# Patient Record
Sex: MALE | Race: WHITE | NOT HISPANIC OR LATINO | Employment: OTHER | URBAN - METROPOLITAN AREA
[De-identification: names, ages, dates, MRNs, and addresses within clinical notes are randomized per-mention and may not be internally consistent; named-entity substitution may affect disease eponyms.]

---

## 2021-04-06 ENCOUNTER — TRANSCRIBE ORDERS (OUTPATIENT)
Dept: ADMINISTRATIVE | Facility: HOSPITAL | Age: 71
End: 2021-04-06

## 2021-04-06 DIAGNOSIS — Z87.891 ENCOUNTER FOR SCREENING FOR ABDOMINAL AORTIC ANEURYSM (AAA) IN PATIENT 50 YEARS OF AGE OR OLDER WITH HISTORY OF SMOKING: Primary | ICD-10-CM

## 2021-04-06 DIAGNOSIS — Z13.6 ENCOUNTER FOR SCREENING FOR ABDOMINAL AORTIC ANEURYSM (AAA) IN PATIENT 50 YEARS OF AGE OR OLDER WITH HISTORY OF SMOKING: Primary | ICD-10-CM

## 2021-05-04 ENCOUNTER — HOSPITAL ENCOUNTER (EMERGENCY)
Facility: HOSPITAL | Age: 71
Discharge: HOME | End: 2021-05-04
Attending: EMERGENCY MEDICINE | Admitting: EMERGENCY MEDICINE

## 2021-05-04 ENCOUNTER — APPOINTMENT (OUTPATIENT)
Dept: GENERAL RADIOLOGY | Facility: HOSPITAL | Age: 71
End: 2021-05-04

## 2021-05-04 ENCOUNTER — APPOINTMENT (OUTPATIENT)
Dept: CT IMAGING | Facility: HOSPITAL | Age: 71
End: 2021-05-04

## 2021-05-04 VITALS
DIASTOLIC BLOOD PRESSURE: 86 MMHG | TEMPERATURE: 98.5 F | HEART RATE: 60 BPM | SYSTOLIC BLOOD PRESSURE: 147 MMHG | OXYGEN SATURATION: 94 % | WEIGHT: 239 LBS | BODY MASS INDEX: 32.37 KG/M2 | HEIGHT: 72 IN | RESPIRATION RATE: 20 BRPM

## 2021-05-04 DIAGNOSIS — R42 DIZZINESS: Primary | ICD-10-CM

## 2021-05-04 LAB
ALBUMIN SERPL-MCNC: 4.2 G/DL (ref 3.5–5.2)
ALBUMIN/GLOB SERPL: 1.8 G/DL
ALP SERPL-CCNC: 69 U/L (ref 39–117)
ALT SERPL W P-5'-P-CCNC: 16 U/L (ref 1–41)
ANION GAP SERPL CALCULATED.3IONS-SCNC: 9.6 MMOL/L (ref 5–15)
AST SERPL-CCNC: 16 U/L (ref 1–40)
BACTERIA UR QL AUTO: ABNORMAL /HPF
BASOPHILS # BLD AUTO: 0.08 10*3/MM3 (ref 0–0.2)
BASOPHILS NFR BLD AUTO: 1 % (ref 0–1.5)
BILIRUB SERPL-MCNC: 0.9 MG/DL (ref 0–1.2)
BILIRUB UR QL STRIP: NEGATIVE
BUN SERPL-MCNC: 18 MG/DL (ref 8–23)
BUN/CREAT SERPL: 16.5 (ref 7–25)
CALCIUM SPEC-SCNC: 9.1 MG/DL (ref 8.6–10.5)
CHLORIDE SERPL-SCNC: 107 MMOL/L (ref 98–107)
CLARITY UR: CLEAR
CO2 SERPL-SCNC: 26.4 MMOL/L (ref 22–29)
COLOR UR: YELLOW
CREAT SERPL-MCNC: 1.09 MG/DL (ref 0.76–1.27)
DEPRECATED RDW RBC AUTO: 42.1 FL (ref 37–54)
EOSINOPHIL # BLD AUTO: 0.37 10*3/MM3 (ref 0–0.4)
EOSINOPHIL NFR BLD AUTO: 4.8 % (ref 0.3–6.2)
ERYTHROCYTE [DISTWIDTH] IN BLOOD BY AUTOMATED COUNT: 12.7 % (ref 12.3–15.4)
GFR SERPL CREATININE-BSD FRML MDRD: 67 ML/MIN/1.73
GFR SERPL CREATININE-BSD FRML MDRD: 81 ML/MIN/1.73
GLOBULIN UR ELPH-MCNC: 2.4 GM/DL
GLUCOSE SERPL-MCNC: 141 MG/DL (ref 65–99)
GLUCOSE UR STRIP-MCNC: NEGATIVE MG/DL
HCT VFR BLD AUTO: 45 % (ref 37.5–51)
HGB BLD-MCNC: 15 G/DL (ref 13–17.7)
HGB UR QL STRIP.AUTO: ABNORMAL
HYALINE CASTS UR QL AUTO: ABNORMAL /LPF
IMM GRANULOCYTES # BLD AUTO: 0.02 10*3/MM3 (ref 0–0.05)
IMM GRANULOCYTES NFR BLD AUTO: 0.3 % (ref 0–0.5)
KETONES UR QL STRIP: NEGATIVE
LEUKOCYTE ESTERASE UR QL STRIP.AUTO: NEGATIVE
LYMPHOCYTES # BLD AUTO: 2.02 10*3/MM3 (ref 0.7–3.1)
LYMPHOCYTES NFR BLD AUTO: 26.1 % (ref 19.6–45.3)
MCH RBC QN AUTO: 30.4 PG (ref 26.6–33)
MCHC RBC AUTO-ENTMCNC: 33.3 G/DL (ref 31.5–35.7)
MCV RBC AUTO: 91.3 FL (ref 79–97)
MONOCYTES # BLD AUTO: 0.8 10*3/MM3 (ref 0.1–0.9)
MONOCYTES NFR BLD AUTO: 10.3 % (ref 5–12)
NEUTROPHILS NFR BLD AUTO: 4.44 10*3/MM3 (ref 1.7–7)
NEUTROPHILS NFR BLD AUTO: 57.5 % (ref 42.7–76)
NITRITE UR QL STRIP: NEGATIVE
NRBC BLD AUTO-RTO: 0 /100 WBC (ref 0–0.2)
PH UR STRIP.AUTO: 7 [PH] (ref 4.5–8)
PLATELET # BLD AUTO: 271 10*3/MM3 (ref 140–450)
PMV BLD AUTO: 10.2 FL (ref 6–12)
POTASSIUM SERPL-SCNC: 3.9 MMOL/L (ref 3.5–5.2)
PROT SERPL-MCNC: 6.6 G/DL (ref 6–8.5)
PROT UR QL STRIP: NEGATIVE
RBC # BLD AUTO: 4.93 10*6/MM3 (ref 4.14–5.8)
RBC # UR: ABNORMAL /HPF
REF LAB TEST METHOD: ABNORMAL
SODIUM SERPL-SCNC: 143 MMOL/L (ref 136–145)
SP GR UR STRIP: 1.02 (ref 1–1.03)
SQUAMOUS #/AREA URNS HPF: ABNORMAL /HPF
TROPONIN T SERPL-MCNC: <0.01 NG/ML (ref 0–0.03)
UROBILINOGEN UR QL STRIP: ABNORMAL
WBC # BLD AUTO: 7.73 10*3/MM3 (ref 3.4–10.8)
WBC UR QL AUTO: ABNORMAL /HPF

## 2021-05-04 PROCEDURE — 99284 EMERGENCY DEPT VISIT MOD MDM: CPT

## 2021-05-04 PROCEDURE — 99284 EMERGENCY DEPT VISIT MOD MDM: CPT | Performed by: EMERGENCY MEDICINE

## 2021-05-04 PROCEDURE — 70450 CT HEAD/BRAIN W/O DYE: CPT

## 2021-05-04 PROCEDURE — 80053 COMPREHEN METABOLIC PANEL: CPT | Performed by: EMERGENCY MEDICINE

## 2021-05-04 PROCEDURE — 93010 ELECTROCARDIOGRAM REPORT: CPT | Performed by: INTERNAL MEDICINE

## 2021-05-04 PROCEDURE — 93005 ELECTROCARDIOGRAM TRACING: CPT | Performed by: EMERGENCY MEDICINE

## 2021-05-04 PROCEDURE — 71046 X-RAY EXAM CHEST 2 VIEWS: CPT

## 2021-05-04 PROCEDURE — 84484 ASSAY OF TROPONIN QUANT: CPT | Performed by: EMERGENCY MEDICINE

## 2021-05-04 PROCEDURE — 85025 COMPLETE CBC W/AUTO DIFF WBC: CPT | Performed by: EMERGENCY MEDICINE

## 2021-05-04 PROCEDURE — 81001 URINALYSIS AUTO W/SCOPE: CPT | Performed by: EMERGENCY MEDICINE

## 2021-05-04 RX ORDER — METFORMIN HYDROCHLORIDE 750 MG/1
750 TABLET, EXTENDED RELEASE ORAL DAILY
COMMUNITY
Start: 2021-04-29 | End: 2022-04-29

## 2021-05-04 RX ORDER — LOSARTAN POTASSIUM 100 MG/1
100 TABLET ORAL DAILY
COMMUNITY
Start: 2021-04-29

## 2021-05-04 RX ORDER — POLYETHYLENE GLYCOL 3350 17 G/17G
17 POWDER, FOR SOLUTION ORAL AS NEEDED
COMMUNITY
Start: 2021-04-02 | End: 2022-04-02

## 2021-05-04 RX ORDER — ATORVASTATIN CALCIUM 20 MG/1
20 TABLET, FILM COATED ORAL DAILY
COMMUNITY
Start: 2021-04-29

## 2021-05-04 RX ORDER — AMLODIPINE BESYLATE 5 MG/1
5 TABLET ORAL DAILY
COMMUNITY
Start: 2021-04-29

## 2021-05-04 RX ORDER — ERGOCALCIFEROL 1.25 MG/1
50000 CAPSULE ORAL WEEKLY
COMMUNITY
Start: 2021-04-02 | End: 2021-07-01

## 2021-05-04 RX ORDER — BLOOD-GLUCOSE METER
1 KIT MISCELLANEOUS DAILY
COMMUNITY
Start: 2021-04-02 | End: 2022-04-02

## 2021-05-04 RX ORDER — FLUTICASONE PROPIONATE 50 MCG
1 SPRAY, SUSPENSION (ML) NASAL DAILY
COMMUNITY
Start: 2021-04-28 | End: 2022-06-07 | Stop reason: SDUPTHER

## 2021-05-04 RX ORDER — PANTOPRAZOLE SODIUM 40 MG/1
40 TABLET, DELAYED RELEASE ORAL DAILY
COMMUNITY
Start: 2021-04-28

## 2021-05-04 RX ORDER — MECLIZINE HYDROCHLORIDE 25 MG/1
25 TABLET ORAL 3 TIMES DAILY PRN
Qty: 30 TABLET | Refills: 0 | Status: SHIPPED | OUTPATIENT
Start: 2021-05-04

## 2021-05-04 RX ORDER — TAMSULOSIN HYDROCHLORIDE 0.4 MG/1
0.4 CAPSULE ORAL DAILY
COMMUNITY
Start: 2021-04-29

## 2021-05-04 RX ORDER — SODIUM CHLORIDE 0.9 % (FLUSH) 0.9 %
10 SYRINGE (ML) INJECTION AS NEEDED
Status: DISCONTINUED | OUTPATIENT
Start: 2021-05-04 | End: 2021-05-04 | Stop reason: HOSPADM

## 2021-05-06 LAB — QT INTERVAL: 399 MS

## 2021-05-25 ENCOUNTER — TRANSCRIBE ORDERS (OUTPATIENT)
Dept: ADMINISTRATIVE | Facility: HOSPITAL | Age: 71
End: 2021-05-25

## 2021-05-25 ENCOUNTER — HOSPITAL ENCOUNTER (OUTPATIENT)
Dept: CARDIOLOGY | Facility: HOSPITAL | Age: 71
Discharge: HOME | End: 2021-05-25
Admitting: NURSE PRACTITIONER

## 2021-05-25 VITALS
WEIGHT: 232 LBS | BODY MASS INDEX: 31.42 KG/M2 | HEIGHT: 72 IN | DIASTOLIC BLOOD PRESSURE: 80 MMHG | SYSTOLIC BLOOD PRESSURE: 154 MMHG | HEART RATE: 62 BPM

## 2021-05-25 DIAGNOSIS — Z87.891 ENCOUNTER FOR SCREENING FOR ABDOMINAL AORTIC ANEURYSM (AAA) IN PATIENT 50 YEARS OF AGE OR OLDER WITH HISTORY OF SMOKING: ICD-10-CM

## 2021-05-25 DIAGNOSIS — H90.0 CONDUCTIVE HEARING LOSS, BILATERAL: Primary | ICD-10-CM

## 2021-05-25 DIAGNOSIS — Z13.6 ENCOUNTER FOR SCREENING FOR ABDOMINAL AORTIC ANEURYSM (AAA) IN PATIENT 50 YEARS OF AGE OR OLDER WITH HISTORY OF SMOKING: ICD-10-CM

## 2021-05-25 LAB
BH CV VAS BP LEFT ARM: NORMAL MMHG
BH CV VAS BP RIGHT ARM: NORMAL MMHG
BH CV XLRA MEAS LEFT ICA/CCA RATIO: 1.02
BH CV XLRA MEAS LEFT MID CCA PSV: NORMAL CM/SEC
BH CV XLRA MEAS LEFT MID ICA PSV: NORMAL CM/SEC
BH CV XLRA MEAS LEFT PROX ECA PSV: NORMAL CM/SEC
BH CV XLRA MEAS RIGHT ICA/CCA RATIO: 1.15
BH CV XLRA MEAS RIGHT MID CCA PSV: NORMAL CM/SEC
BH CV XLRA MEAS RIGHT MID ICA PSV: NORMAL CM/SEC
BH CV XLRA MEAS RIGHT PROX ECA PSV: NORMAL CM/SEC

## 2021-05-25 PROCEDURE — 93799 UNLISTED CV SVC/PROCEDURE: CPT

## 2021-06-16 ENCOUNTER — HOSPITAL ENCOUNTER (OUTPATIENT)
Dept: MRI IMAGING | Facility: HOSPITAL | Age: 71
Discharge: HOME | End: 2021-06-16

## 2021-06-16 DIAGNOSIS — H90.0 CONDUCTIVE HEARING LOSS, BILATERAL: ICD-10-CM

## 2021-06-22 ENCOUNTER — HOSPITAL ENCOUNTER (OUTPATIENT)
Dept: MRI IMAGING | Facility: HOSPITAL | Age: 71
Discharge: HOME | End: 2021-06-22

## 2021-06-23 ENCOUNTER — HOSPITAL ENCOUNTER (OUTPATIENT)
Dept: MRI IMAGING | Facility: HOSPITAL | Age: 71
Discharge: HOME | End: 2021-06-23
Admitting: OTOLARYNGOLOGY

## 2021-06-23 PROCEDURE — 0 GADOBENATE DIMEGLUMINE 529 MG/ML SOLUTION: Performed by: OTOLARYNGOLOGY

## 2021-06-23 PROCEDURE — 70553 MRI BRAIN STEM W/O & W/DYE: CPT

## 2021-06-23 PROCEDURE — A9577 INJ MULTIHANCE: HCPCS | Performed by: OTOLARYNGOLOGY

## 2021-06-23 RX ADMIN — GADOBENATE DIMEGLUMINE 20 ML: 529 INJECTION, SOLUTION INTRAVENOUS at 10:32

## 2021-07-07 ENCOUNTER — TRANSCRIBE ORDERS (OUTPATIENT)
Dept: ADMINISTRATIVE | Facility: HOSPITAL | Age: 71
End: 2021-07-07

## 2021-07-07 DIAGNOSIS — Z13.6 SCREENING FOR AAA (AORTIC ABDOMINAL ANEURYSM): Primary | ICD-10-CM

## 2021-07-16 ENCOUNTER — HOSPITAL ENCOUNTER (OUTPATIENT)
Dept: ULTRASOUND IMAGING | Facility: HOSPITAL | Age: 71
Discharge: HOME | End: 2021-07-16
Admitting: NURSE PRACTITIONER

## 2021-07-16 DIAGNOSIS — Z13.6 SCREENING FOR AAA (AORTIC ABDOMINAL ANEURYSM): ICD-10-CM

## 2021-07-16 PROCEDURE — 76706 US ABDL AORTA SCREEN AAA: CPT

## 2021-12-21 ENCOUNTER — OFFICE VISIT (OUTPATIENT)
Dept: ORTHOPEDIC SURGERY | Facility: CLINIC | Age: 71
End: 2021-12-21

## 2021-12-21 VITALS
DIASTOLIC BLOOD PRESSURE: 80 MMHG | BODY MASS INDEX: 30.48 KG/M2 | WEIGHT: 225 LBS | SYSTOLIC BLOOD PRESSURE: 158 MMHG | HEART RATE: 74 BPM | HEIGHT: 72 IN

## 2021-12-21 DIAGNOSIS — G89.29 CHRONIC PAIN OF RIGHT KNEE: Primary | ICD-10-CM

## 2021-12-21 DIAGNOSIS — M25.561 CHRONIC PAIN OF RIGHT KNEE: Primary | ICD-10-CM

## 2021-12-21 DIAGNOSIS — M17.11 PRIMARY OSTEOARTHRITIS OF RIGHT KNEE: ICD-10-CM

## 2021-12-21 PROCEDURE — 73562 X-RAY EXAM OF KNEE 3: CPT | Performed by: ORTHOPAEDIC SURGERY

## 2021-12-21 PROCEDURE — 99203 OFFICE O/P NEW LOW 30 MIN: CPT | Performed by: ORTHOPAEDIC SURGERY

## 2021-12-21 PROCEDURE — 20610 DRAIN/INJ JOINT/BURSA W/O US: CPT | Performed by: ORTHOPAEDIC SURGERY

## 2021-12-21 RX ORDER — ASPIRIN 81 MG/1
81 TABLET ORAL DAILY
COMMUNITY
Start: 2021-07-06 | End: 2022-07-06

## 2021-12-21 RX ORDER — BETAMETHASONE SODIUM PHOSPHATE AND BETAMETHASONE ACETATE 3; 3 MG/ML; MG/ML
6 INJECTION, SUSPENSION INTRA-ARTICULAR; INTRALESIONAL; INTRAMUSCULAR; SOFT TISSUE
Status: COMPLETED | OUTPATIENT
Start: 2021-12-21 | End: 2021-12-21

## 2021-12-21 RX ORDER — LIDOCAINE HYDROCHLORIDE 10 MG/ML
4 INJECTION, SOLUTION EPIDURAL; INFILTRATION; INTRACAUDAL; PERINEURAL
Status: COMPLETED | OUTPATIENT
Start: 2021-12-21 | End: 2021-12-21

## 2021-12-21 RX ADMIN — BETAMETHASONE SODIUM PHOSPHATE AND BETAMETHASONE ACETATE 6 MG: 3; 3 INJECTION, SUSPENSION INTRA-ARTICULAR; INTRALESIONAL; INTRAMUSCULAR; SOFT TISSUE at 13:45

## 2021-12-21 RX ADMIN — LIDOCAINE HYDROCHLORIDE 4 ML: 10 INJECTION, SOLUTION EPIDURAL; INFILTRATION; INTRACAUDAL; PERINEURAL at 13:45

## 2021-12-21 NOTE — PROGRESS NOTES
Large Joint Arthrocentesis: R knee  Date/Time: 12/21/2021 1:45 PM  Consent given by: patient  Site marked: site marked  Timeout: Immediately prior to procedure a time out was called to verify the correct patient, procedure, equipment, support staff and site/side marked as required   Supporting Documentation  Indications: pain   Procedure Details  Location: knee - R knee  Preparation: Patient was prepped and draped in the usual sterile fashion  Needle size: 22 G  Approach: superior  Medications administered: 6 mg betamethasone acetate-betamethasone sodium phosphate 6 (3-3) MG/ML; 4 mL lidocaine PF 1% 1 %  Patient tolerance: patient tolerated the procedure well with no immediate complications      Subjective: Right knee pain     Patient ID: Cory Hardwick is a 71 y.o. male.    Chief Complaint:    History of Present Illness 71-year male presents evaluation of his right knee which is symptomatic for several years for constant progressive discomfort.  States he really does not have any pain but the knee just gives out without warning going on again for several years.  No history of trauma.  He did undergo a left total knee arthroplasty living in [place] 3 years ago and the only complaint he has with that is lack of flexion of the knee.  Again he denies any knee pain.       Social History     Occupational History   • Not on file   Tobacco Use   • Smoking status: Never Smoker   • Smokeless tobacco: Never Used   Vaping Use   • Vaping Use: Never used   Substance and Sexual Activity   • Alcohol use: Not Currently   • Drug use: Never   • Sexual activity: Defer      Review of Systems   Constitutional: Negative for chills, diaphoresis, fever and unexpected weight change.   HENT: Negative for hearing loss, nosebleeds, sore throat and tinnitus.    Eyes: Negative for pain and visual disturbance.   Respiratory: Negative for cough, shortness of breath and wheezing.    Cardiovascular: Negative for chest pain and palpitations.    Gastrointestinal: Negative for abdominal pain, diarrhea, nausea and vomiting.   Endocrine: Negative for cold intolerance, heat intolerance and polydipsia.   Genitourinary: Negative for difficulty urinating, dysuria and hematuria.   Musculoskeletal: Positive for arthralgias and myalgias. Negative for joint swelling.   Skin: Negative for rash and wound.   Allergic/Immunologic: Negative for environmental allergies.   Neurological: Negative for dizziness, syncope and numbness.   Hematological: Does not bruise/bleed easily.   Psychiatric/Behavioral: Negative for dysphoric mood and sleep disturbance. The patient is not nervous/anxious.          Past Medical History:   Diagnosis Date   • Balance disorder    • Diabetes mellitus (HCC)    • Dizziness    • Fall    • GERD (gastroesophageal reflux disease)    • Hypertension    • Low vitamin D level    • Memory loss    • Peripheral neuropathy      Past Surgical History:   Procedure Laterality Date   • JOINT REPLACEMENT      right hip    • JOINT REPLACEMENT      knee      History reviewed. No pertinent family history.      Objective:  Vitals:    12/21/21 1310   BP: 158/80   Pulse: 74         12/21/21  1310   Weight: 102 kg (225 lb)     Body mass index is 30.52 kg/m².        Ortho Exam  AP lateral sunrise view of the right knee does show early arthritic changes with decrease in the medial joint space with osteophytes medially in the patellofemoral joint.  No prior x-rays available for comparison.  He is alert and oriented x3.  Head is normocephalic and sclerae clear.  The right knee shows no swelling effusion erythema but there is mild crepitus with range of motion to 0 125 degrees.  Quad and hamstring function is 5/5.  No instability at 0 or 90 degrees nor any varus or valgus instability at 10 to 30 degrees.  Mild medial joint line tenderness with negative Lucille's.  His calves are nontender.  He denies any motor or sensory deficit he has good capillary refill the skin is cool  to touch.  He is diabetic and is not taking any anti-inflammatories.    Assessment:        1. Chronic pain of right knee    2. Primary osteoarthritis of right knee           Plan: Reviewed at length with the patient his x-rays history and physical exam.  He does not have any pain just a sensation of the knee giving way which is due to the early arthritic changes.  After reviewing treatment options again proceed with a cortisone injection of lidocaine and Celestone at a ratio 4:1 which was given through the superolateral portal tolerating well.  Postinjection instructions particular about monitoring blood glucose level for the next 2 to 3 days given.  Return to see me as needed.  Answered all questions            Work Status:    DASHAWN query complete.    Orders:  Orders Placed This Encounter   Procedures   • Large Joint Arthrocentesis: R knee   • XR Knee 3+ View With [place] Right       Medications:  No orders of the defined types were placed in this encounter.      Followup:  Return if symptoms worsen or fail to improve.          Dictated utilizing Dragon dictation

## 2022-06-07 ENCOUNTER — OFFICE VISIT (OUTPATIENT)
Dept: ORTHOPEDIC SURGERY | Facility: CLINIC | Age: 72
End: 2022-06-07

## 2022-06-07 VITALS — HEIGHT: 72 IN | WEIGHT: 225 LBS | BODY MASS INDEX: 30.48 KG/M2

## 2022-06-07 DIAGNOSIS — M25.562 CHRONIC PAIN OF LEFT KNEE: Primary | ICD-10-CM

## 2022-06-07 DIAGNOSIS — M24.562 FLEXION CONTRACTURE OF LEFT KNEE: ICD-10-CM

## 2022-06-07 DIAGNOSIS — Z96.652 STATUS POST TOTAL LEFT KNEE REPLACEMENT: ICD-10-CM

## 2022-06-07 DIAGNOSIS — G89.29 CHRONIC PAIN OF LEFT KNEE: Primary | ICD-10-CM

## 2022-06-07 PROCEDURE — 73562 X-RAY EXAM OF KNEE 3: CPT | Performed by: ORTHOPAEDIC SURGERY

## 2022-06-07 PROCEDURE — 99214 OFFICE O/P EST MOD 30 MIN: CPT | Performed by: ORTHOPAEDIC SURGERY

## 2022-06-07 RX ORDER — FUROSEMIDE 20 MG/1
TABLET ORAL
COMMUNITY
Start: 2022-06-06

## 2022-06-07 RX ORDER — POTASSIUM CHLORIDE 750 MG/1
10 TABLET, EXTENDED RELEASE ORAL DAILY
COMMUNITY
Start: 2022-06-06 | End: 2022-07-06

## 2022-06-07 RX ORDER — FLUTICASONE PROPIONATE 50 MCG
1 SPRAY, SUSPENSION (ML) NASAL DAILY
COMMUNITY
Start: 2022-05-13 | End: 2022-08-11

## 2022-06-07 NOTE — PROGRESS NOTES
Subjective: Left knee stiffness     Patient ID: Cory Hardwick is a 71 y.o. male.    Chief Complaint:    History of Present Illness 71-year male known to me is seen for new problem involving his left knee.  He had a total knee arthroplasty 5 years ago when in [place] is an persistent problem with stiffness in that knee since that time presents today for referral to physical therapy hoping that would resolve the issue.  He is not really having any pain just cannot flex his knee to go up and down stairs normally       Social History     Occupational History   • Not on file   Tobacco Use   • Smoking status: Never Smoker   • Smokeless tobacco: Never Used   Vaping Use   • Vaping Use: Never used   Substance and Sexual Activity   • Alcohol use: Not Currently   • Drug use: Never   • Sexual activity: Defer      Review of Systems   Constitutional: Negative for chills, diaphoresis, fever and unexpected weight change.   HENT: Negative for hearing loss, nosebleeds, sore throat and tinnitus.    Eyes: Negative for pain and visual disturbance.   Respiratory: Negative for cough, shortness of breath and wheezing.    Cardiovascular: Negative for chest pain and palpitations.   Gastrointestinal: Negative for abdominal pain, diarrhea, nausea and vomiting.   Endocrine: Negative for cold intolerance, heat intolerance and polydipsia.   Genitourinary: Negative for difficulty urinating, dysuria and hematuria.   Musculoskeletal: Positive for arthralgias and myalgias. Negative for joint swelling.   Skin: Negative for rash and wound.   Allergic/Immunologic: Negative for environmental allergies.   Neurological: Negative for dizziness, syncope and numbness.   Hematological: Does not bruise/bleed easily.   Psychiatric/Behavioral: Negative for dysphoric mood and sleep disturbance. The patient is not nervous/anxious.          Past Medical History:   Diagnosis Date   • Balance disorder    • Diabetes mellitus (HCC)    • Dizziness    • Fall    • GERD  (gastroesophageal reflux disease)    • Hypertension    • Low vitamin D level    • Memory loss    • Peripheral neuropathy      Past Surgical History:   Procedure Laterality Date   • JOINT REPLACEMENT      right hip    • JOINT REPLACEMENT      knee      History reviewed. No pertinent family history.      Objective:  There were no vitals filed for this visit.      06/07/22  1532   Weight: 102 kg (225 lb)     Body mass index is 30.52 kg/m².        Ortho Exam   AP lateral and sunrise view of the left knee shows a well-positioned total knee arthroplasty with no acute changes noted.  He is alert and oriented x3.  He lacks about 5 degrees of extension can flex to about 100 degrees.  There is no instability throughout the arc of motion.  Quad and hamstring function is 4 out of 5.  His calf is nontender.  Is good distal pulses no motor or sensory deficit the skin is cool to touch.    Assessment:        1. Chronic pain of left knee    2. Status post total left knee replacement    3. Flexion contracture of left knee           Plan: Reviewed the x-rays with the patient.  So at this time 5 years out from his surgery there is no way to regain full motion without surgical intervention and wide excision of the scar tissue.  I told him that as he is asymptomatic I would not recommend any surgery at this time.  And again I told him physical therapy is of no value to help gain either extension or flexion.  He does have limitations as far as the movement but he is asymptomatic as far as pain.  Surgery is not indicated as far as I am concerned.  Patient was in agreement.  Answered all questions.  Return as needed            Work Status:    DASHAWN query complete.    Orders:  Orders Placed This Encounter   Procedures   • XR Knee 3+ View With [place] Left       Medications:  No orders of the defined types were placed in this encounter.      Followup:  Return if symptoms worsen or fail to improve.          Dictated utilizing Dragon dictation

## 2022-07-25 ENCOUNTER — APPOINTMENT (OUTPATIENT)
Dept: CT IMAGING | Facility: HOSPITAL | Age: 72
End: 2022-07-25

## 2022-07-25 ENCOUNTER — HOSPITAL ENCOUNTER (EMERGENCY)
Facility: HOSPITAL | Age: 72
Discharge: HOME | End: 2022-07-25
Attending: EMERGENCY MEDICINE | Admitting: EMERGENCY MEDICINE

## 2022-07-25 VITALS
HEIGHT: 72 IN | TEMPERATURE: 98.9 F | RESPIRATION RATE: 16 BRPM | BODY MASS INDEX: 30.07 KG/M2 | HEART RATE: 65 BPM | DIASTOLIC BLOOD PRESSURE: 73 MMHG | SYSTOLIC BLOOD PRESSURE: 143 MMHG | WEIGHT: 222 LBS | OXYGEN SATURATION: 97 %

## 2022-07-25 DIAGNOSIS — R10.9 LEFT FLANK PAIN: Primary | ICD-10-CM

## 2022-07-25 LAB
ALBUMIN SERPL-MCNC: 4 G/DL (ref 3.5–5.2)
ALBUMIN/GLOB SERPL: 1.7 G/DL
ALP SERPL-CCNC: 76 U/L (ref 39–117)
ALT SERPL W P-5'-P-CCNC: 8 U/L (ref 1–41)
ANION GAP SERPL CALCULATED.3IONS-SCNC: 7.3 MMOL/L (ref 5–15)
AST SERPL-CCNC: 12 U/L (ref 1–40)
BACTERIA UR QL AUTO: ABNORMAL /HPF
BASOPHILS # BLD AUTO: 0.13 10*3/MM3 (ref 0–0.2)
BASOPHILS NFR BLD AUTO: 1.9 % (ref 0–1.5)
BILIRUB SERPL-MCNC: 0.9 MG/DL (ref 0–1.2)
BILIRUB UR QL STRIP: NEGATIVE
BUN SERPL-MCNC: 18 MG/DL (ref 8–23)
BUN/CREAT SERPL: 17.6 (ref 7–25)
CALCIUM SPEC-SCNC: 9.3 MG/DL (ref 8.6–10.5)
CHLORIDE SERPL-SCNC: 106 MMOL/L (ref 98–107)
CLARITY UR: CLEAR
CO2 SERPL-SCNC: 27.7 MMOL/L (ref 22–29)
COLOR UR: YELLOW
CREAT SERPL-MCNC: 1.02 MG/DL (ref 0.76–1.27)
DEPRECATED RDW RBC AUTO: 41.8 FL (ref 37–54)
EGFRCR SERPLBLD CKD-EPI 2021: 78.6 ML/MIN/1.73
EOSINOPHIL # BLD AUTO: 0.24 10*3/MM3 (ref 0–0.4)
EOSINOPHIL NFR BLD AUTO: 3.6 % (ref 0.3–6.2)
ERYTHROCYTE [DISTWIDTH] IN BLOOD BY AUTOMATED COUNT: 12.7 % (ref 12.3–15.4)
GLOBULIN UR ELPH-MCNC: 2.4 GM/DL
GLUCOSE SERPL-MCNC: 101 MG/DL (ref 65–99)
GLUCOSE UR STRIP-MCNC: NEGATIVE MG/DL
HCT VFR BLD AUTO: 45.9 % (ref 37.5–51)
HGB BLD-MCNC: 15.3 G/DL (ref 13–17.7)
HGB UR QL STRIP.AUTO: ABNORMAL
HYALINE CASTS UR QL AUTO: ABNORMAL /LPF
IMM GRANULOCYTES # BLD AUTO: 0.02 10*3/MM3 (ref 0–0.05)
IMM GRANULOCYTES NFR BLD AUTO: 0.3 % (ref 0–0.5)
KETONES UR QL STRIP: NEGATIVE
LEUKOCYTE ESTERASE UR QL STRIP.AUTO: NEGATIVE
LYMPHOCYTES # BLD AUTO: 1.67 10*3/MM3 (ref 0.7–3.1)
LYMPHOCYTES NFR BLD AUTO: 25 % (ref 19.6–45.3)
MCH RBC QN AUTO: 29.8 PG (ref 26.6–33)
MCHC RBC AUTO-ENTMCNC: 33.3 G/DL (ref 31.5–35.7)
MCV RBC AUTO: 89.3 FL (ref 79–97)
MONOCYTES # BLD AUTO: 0.79 10*3/MM3 (ref 0.1–0.9)
MONOCYTES NFR BLD AUTO: 11.8 % (ref 5–12)
NEUTROPHILS NFR BLD AUTO: 3.83 10*3/MM3 (ref 1.7–7)
NEUTROPHILS NFR BLD AUTO: 57.4 % (ref 42.7–76)
NITRITE UR QL STRIP: NEGATIVE
NRBC BLD AUTO-RTO: 0 /100 WBC (ref 0–0.2)
PH UR STRIP.AUTO: 6.5 [PH] (ref 4.5–8)
PLATELET # BLD AUTO: 286 10*3/MM3 (ref 140–450)
PMV BLD AUTO: 10.3 FL (ref 6–12)
POTASSIUM SERPL-SCNC: 4 MMOL/L (ref 3.5–5.2)
PROT SERPL-MCNC: 6.4 G/DL (ref 6–8.5)
PROT UR QL STRIP: NEGATIVE
RBC # BLD AUTO: 5.14 10*6/MM3 (ref 4.14–5.8)
RBC # UR STRIP: ABNORMAL /HPF
REF LAB TEST METHOD: ABNORMAL
SODIUM SERPL-SCNC: 141 MMOL/L (ref 136–145)
SP GR UR STRIP: 1.02 (ref 1–1.03)
SQUAMOUS #/AREA URNS HPF: ABNORMAL /HPF
UROBILINOGEN UR QL STRIP: ABNORMAL
WBC # UR STRIP: ABNORMAL /HPF
WBC NRBC COR # BLD: 6.68 10*3/MM3 (ref 3.4–10.8)

## 2022-07-25 PROCEDURE — 81001 URINALYSIS AUTO W/SCOPE: CPT

## 2022-07-25 PROCEDURE — 80053 COMPREHEN METABOLIC PANEL: CPT

## 2022-07-25 PROCEDURE — 99283 EMERGENCY DEPT VISIT LOW MDM: CPT

## 2022-07-25 PROCEDURE — 25010000002 KETOROLAC TROMETHAMINE PER 15 MG

## 2022-07-25 PROCEDURE — 96374 THER/PROPH/DIAG INJ IV PUSH: CPT

## 2022-07-25 PROCEDURE — 74176 CT ABD & PELVIS W/O CONTRAST: CPT

## 2022-07-25 PROCEDURE — 85025 COMPLETE CBC W/AUTO DIFF WBC: CPT

## 2022-07-25 RX ORDER — SODIUM CHLORIDE 0.9 % (FLUSH) 0.9 %
10 SYRINGE (ML) INJECTION AS NEEDED
Status: DISCONTINUED | OUTPATIENT
Start: 2022-07-25 | End: 2022-07-25 | Stop reason: HOSPADM

## 2022-07-25 RX ORDER — METHOCARBAMOL 750 MG/1
750 TABLET, FILM COATED ORAL 3 TIMES DAILY PRN
Qty: 15 TABLET | Refills: 0 | Status: SHIPPED | OUTPATIENT
Start: 2022-07-25 | End: 2022-07-30

## 2022-07-25 RX ORDER — METHOCARBAMOL 500 MG/1
750 TABLET, FILM COATED ORAL 4 TIMES DAILY
Status: DISCONTINUED | OUTPATIENT
Start: 2022-07-25 | End: 2022-07-25 | Stop reason: HOSPADM

## 2022-07-25 RX ORDER — KETOROLAC TROMETHAMINE 30 MG/ML
15 INJECTION, SOLUTION INTRAMUSCULAR; INTRAVENOUS ONCE
Status: COMPLETED | OUTPATIENT
Start: 2022-07-25 | End: 2022-07-25

## 2022-07-25 RX ADMIN — KETOROLAC TROMETHAMINE 15 MG: 30 INJECTION, SOLUTION INTRAMUSCULAR; INTRAVENOUS at 17:55

## 2022-07-25 RX ADMIN — SODIUM CHLORIDE 500 ML: 9 INJECTION, SOLUTION INTRAVENOUS at 17:55

## 2022-07-25 NOTE — ED PROVIDER NOTES
EMERGENCY DEPARTMENT ENCOUNTER      Room Number: 07/07    History is provided by the patient, no translation services needed    HPI:    Chief complaint: Flank pain    Location: Left flank    Quality/Severity: Patient describes the pain as a sharp and cramping pain.  He rates the severity an 8 out of 10.    Timing/Duration: 2 days    Modifying Factors: None    Associated Symptoms: None    Narrative: Pt is a 71 y.o. male who presents complaining of left flank pain x2 days.  Patient describes the pain as a sharp and cramping pain that he rates an 8 out of 10.  Patient advises that he had not done any strenuous activity or movement in an odd manner when the pain started.  He states that he cannot remember what he was doing when the pain started.  Patient advises that he bent over today to  a pair of pants and the pain worsened.  He states that turning at his hips does not worsen the pain.  He denies any urinary symptoms, abdominal pain, chest pain, shortness of breath, headaches.      PMD: Payton Salter APRN    REVIEW OF SYSTEMS  Review of Systems   Constitutional: Negative for fever.   HENT: Negative for congestion.    Eyes: Negative for visual disturbance.   Respiratory: Negative for cough, chest tightness and shortness of breath.    Cardiovascular: Negative for chest pain, palpitations and leg swelling.   Gastrointestinal: Negative for abdominal pain, blood in stool, constipation, diarrhea, nausea and vomiting.   Genitourinary: Positive for flank pain (Left flank). Negative for difficulty urinating, dysuria and hematuria.   Musculoskeletal: Negative for gait problem and neck pain.   Skin: Negative for color change, pallor, rash and wound.   Neurological: Negative for dizziness, syncope, weakness, numbness and headaches.   Psychiatric/Behavioral: Negative for confusion and suicidal ideas. The patient is not nervous/anxious.          PAST MEDICAL HISTORY  Active Ambulatory Problems     Diagnosis Date Noted   •  No Active Ambulatory Problems     Resolved Ambulatory Problems     Diagnosis Date Noted   • No Resolved Ambulatory Problems     Past Medical History:   Diagnosis Date   • Balance disorder    • Diabetes mellitus (HCC)    • Dizziness    • Fall    • GERD (gastroesophageal reflux disease)    • Hypertension    • Low vitamin D level    • Memory loss    • Peripheral neuropathy        PAST SURGICAL HISTORY  Past Surgical History:   Procedure Laterality Date   • JOINT REPLACEMENT      right hip    • JOINT REPLACEMENT      knee        FAMILY HISTORY  History reviewed. No pertinent family history.    SOCIAL HISTORY  Social History     Socioeconomic History   • Marital status: Single   Tobacco Use   • Smoking status: Never Smoker   • Smokeless tobacco: Never Used   Vaping Use   • Vaping Use: Never used   Substance and Sexual Activity   • Alcohol use: Not Currently   • Drug use: Never   • Sexual activity: Defer       ALLERGIES  Antazoline, Hydrocodone-acetaminophen, and Metformin      Current Facility-Administered Medications:   •  methocarbamol (ROBAXIN) tablet 750 mg, 750 mg, Oral, 4x Daily, Magdalena Nicole PA-C  •  [COMPLETED] Insert peripheral IV, , , Once **AND** sodium chloride 0.9 % flush 10 mL, 10 mL, Intravenous, PRN, Magdalena Nicole PA-C    Current Outpatient Medications:   •  amLODIPine (NORVASC) 5 MG tablet, Take 5 mg by mouth Daily., Disp: , Rfl:   •  atorvastatin (LIPITOR) 20 MG tablet, Take 20 mg by mouth Daily., Disp: , Rfl:   •  fluticasone (FLONASE) 50 MCG/ACT nasal spray, 1 spray into the nostril(s) as directed by provider Daily., Disp: , Rfl:   •  furosemide (LASIX) 20 MG tablet, , Disp: , Rfl:   •  losartan (COZAAR) 100 MG tablet, Take 100 mg by mouth Daily., Disp: , Rfl:   •  meclizine (ANTIVERT) 25 MG tablet, Take 1 tablet by mouth 3 (Three) Times a Day As Needed for Dizziness or Nausea for up to 30 doses., Disp: 30 tablet, Rfl: 0  •  pantoprazole (PROTONIX) 40 MG EC tablet, Take 40 mg by mouth Daily.,  Disp: , Rfl:   •  tamsulosin (FLOMAX) 0.4 MG capsule 24 hr capsule, Take 0.4 mg by mouth Daily., Disp: , Rfl:   •  metFORMIN ER (GLUCOPHAGE-XR) 750 MG 24 hr tablet, Take 750 mg by mouth Daily., Disp: , Rfl:   •  methocarbamol (ROBAXIN) 750 MG tablet, Take 1 tablet by mouth 3 (Three) Times a Day As Needed for Muscle Spasms for up to 5 days., Disp: 15 tablet, Rfl: 0    PHYSICAL EXAM  ED Triage Vitals [07/25/22 1622]   Temp Heart Rate Resp BP SpO2   98.9 °F (37.2 °C) 65 16 140/80 96 %      Temp src Heart Rate Source Patient Position BP Location FiO2 (%)   Oral Monitor Lying Right arm --       Physical Exam  Vitals and nursing note reviewed.   Constitutional:       General: He is not in acute distress.     Appearance: Normal appearance. He is not ill-appearing, toxic-appearing or diaphoretic.   HENT:      Head: Normocephalic and atraumatic.   Eyes:      Extraocular Movements: Extraocular movements intact.      Conjunctiva/sclera: Conjunctivae normal.      Pupils: Pupils are equal, round, and reactive to light.   Cardiovascular:      Rate and Rhythm: Normal rate and regular rhythm.      Heart sounds: Normal heart sounds.   Pulmonary:      Effort: Pulmonary effort is normal. No respiratory distress.      Breath sounds: Normal breath sounds. No stridor. No wheezing, rhonchi or rales.   Chest:      Chest wall: No tenderness.   Abdominal:      General: Bowel sounds are normal. There is no distension.      Palpations: Abdomen is soft. There is no mass.      Tenderness: There is no abdominal tenderness. There is no right CVA tenderness, left CVA tenderness, guarding or rebound.   Musculoskeletal:         General: No swelling, tenderness, deformity or signs of injury. Normal range of motion.      Cervical back: Normal range of motion and neck supple. No tenderness.      Right lower leg: No edema.      Left lower leg: No edema.   Skin:     General: Skin is warm and dry.      Coloration: Skin is not jaundiced or pale.       Findings: No bruising, erythema, lesion or rash.   Neurological:      Mental Status: He is alert and oriented to person, place, and time.   Psychiatric:         Mood and Affect: Mood and affect normal.           LAB RESULTS  Lab Results (last 24 hours)     Procedure Component Value Units Date/Time    CBC & Differential [[id number]]  (Abnormal) Collected: 07/25/22 1753    Specimen: Blood Updated: 07/25/22 1808    Narrative:      The following orders were created for panel order CBC & Differential.  Procedure                               Abnormality         Status                     ---------                               -----------         ------                     CBC Auto Differential[[id number]]        Abnormal            Final result                 Please view results for these tests on the individual orders.    Comprehensive Metabolic Panel [[id number]]  (Abnormal) Collected: 07/25/22 1753    Specimen: Blood Updated: 07/25/22 1828     Glucose 101 mg/dL      BUN 18 mg/dL      Creatinine 1.02 mg/dL      Sodium 141 mmol/L      Potassium 4.0 mmol/L      Chloride 106 mmol/L      CO2 27.7 mmol/L      Calcium 9.3 mg/dL      Total Protein 6.4 g/dL      Albumin 4.00 g/dL      ALT (SGPT) 8 U/L      AST (SGOT) 12 U/L      Alkaline Phosphatase 76 U/L      Total Bilirubin 0.9 mg/dL      Globulin 2.4 gm/dL      A/G Ratio 1.7 g/dL      BUN/Creatinine Ratio 17.6     Anion Gap 7.3 mmol/L      eGFR 78.6 mL/min/1.73      Comment: National Kidney Foundation and American Society of Nephrology (ASN) Task Force recommended calculation based on the Chronic Kidney Disease Epidemiology Collaboration (CKD-EPI) equation refit without adjustment for race.       Narrative:      GFR Normal >60  Chronic Kidney Disease <60  Kidney Failure <15      Urinalysis With Microscopic If Indicated (No Culture) - Urine, Clean Catch [[id number]]  (Abnormal) Collected: 07/25/22 1753    Specimen: Urine, Clean Catch Updated: 07/25/22 1813     Color, UA  Yellow     Appearance, UA Clear     pH, UA 6.5     Specific Gravity, UA 1.020     Glucose, UA Negative     Ketones, UA Negative     Bilirubin, UA Negative     Blood, UA Small (1+)     Protein, UA Negative     Leuk Esterase, UA Negative     Nitrite, UA Negative     Urobilinogen, UA 1.0 E.U./dL    CBC Auto Differential [[id number]]  (Abnormal) Collected: 07/25/22 1753    Specimen: Blood Updated: 07/25/22 1808     WBC 6.68 10*3/mm3      RBC 5.14 10*6/mm3      Hemoglobin 15.3 g/dL      Hematocrit 45.9 %      MCV 89.3 fL      MCH 29.8 pg      MCHC 33.3 g/dL      RDW 12.7 %      RDW-SD 41.8 fl      MPV 10.3 fL      Platelets 286 10*3/mm3      Neutrophil % 57.4 %      Lymphocyte % 25.0 %      Monocyte % 11.8 %      Eosinophil % 3.6 %      Basophil % 1.9 %      Immature Grans % 0.3 %      Neutrophils, Absolute 3.83 10*3/mm3      Lymphocytes, Absolute 1.67 10*3/mm3      Monocytes, Absolute 0.79 10*3/mm3      Eosinophils, Absolute 0.24 10*3/mm3      Basophils, Absolute 0.13 10*3/mm3      Immature Grans, Absolute 0.02 10*3/mm3      nRBC 0.0 /100 WBC     Urinalysis, Microscopic Only - Urine, Clean Catch [[id number]]  (Abnormal) Collected: 07/25/22 1753    Specimen: Urine, Clean Catch Updated: 07/25/22 1817     RBC, UA 3-5 /HPF      WBC, UA None Seen /HPF      Bacteria, UA None Seen /HPF      Squamous Epithelial Cells, UA 0-2 /HPF      Hyaline Casts, UA None Seen /LPF      Methodology Manual Light Microscopy            I ordered the above labs and reviewed the results    RADIOLOGY  CT Abdomen Pelvis Without Contrast    Result Date: 7/25/2022  CT Abdomen Pelvis WO INDICATION: Emergency room presentation with left flank pain Additional Relevant clinical info: Back pain started 4 days ago; No known injury; Hx of kidney stones TECHNIQUE: CT of the abdomen and pelvis without IV contrast. Coronal and sagittal reconstructions were obtained.  Oral contrast not given.  Radiation dose reduction techniques included automated exposure control  or exposure modulation based on body size. Count of known CT and cardiac nuc med studies performed in previous 12 months: 0. COMPARISON: No relevant comparison or correlation studies available at time of dictation. FINDINGS:  There is limited assessment of the solid viscera and the bowel wall without the use of any IV contrast. Scarring of the kidneys noted worse on the left. Benign right renal simple cyst seen requiring no further imaging follow-up. Grossly, no worrisome contour abnormalities of solid viscera or gross low-attenuation lesion seen. No gross obstructive uropathy or urolithiasis. The left ureter can be traced down to the UV junction without evidence of stone. No significant periureteral stranding is appreciated. Without oral contrast, bowel evaluation is limited. No gross evidence of bowel obstruction. No free air, free fluid or focal inflammatory change is present. Streak artifact hinders evaluation of the pelvis and arises from right hip hardware. Grossly right hip hardware appears intact. IVC filter in place. Moderate atherosclerotic plaque seen in the aorta. Prostate is enlarged at 6.5 x 5.1 x 5.5 cm. Bladder is not adequately distended to evaluate. No infiltrate, lung bases with 2 mm subpleural nodule seen in the right lung base. Severe atrophy of the right psoas muscle noted. Osseous structures are intact.     1. No acute intra-abdominal process within confines of noncontrast study. 2. Incidental lung nodules right lung base seen recommendations below. 3. IVC filter in place. Determine clinically if continued IVC filter management necessary. If no longer necessary, recommend interventional radiology consultation. Please see the following excerpt from the Fleischner's thoracic Society recommendations for pulmonary nodule follow-up:  FLEISCHNER SOCIETY PULMONARY NODULE FOLLOW UP:  Multiple nodule size: <6 mm *   low risk patients: no follow-up needed *   high risk patients: optional CT at 12 months  [ NOTE: *  - low risk patients: minimal or absent history of smoking and or other known risk factors *  - high risk patients: history of smoking or of other known risk factors (e.g. first degree relative with lung cancer, or exposure to asbestos, radon, uranium)]  Signer Name: Kaykay Roldan MD  Signed: 7/25/2022 6:27 PM  Workstation Name: [facility]  [facility]      I ordered the above radiologic testing and reviewed the results    PROCEDURES  Procedures      PROGRESS AND CONSULTS           MEDICAL DECISION MAKING    MDM     My differential diagnosis for back pain includes but is not limited to:  Musculoskeletal strain, contusion, retroperitoneal hematoma, disc protrusion, vertebral fracture, transverse process fracture, rib fracture, facet syndrome, sacroiliac joint strain, sciatica, renal injury, splenic injury, pancreatic injury, osteoarthritis, lumbar spondylosis, spinal stenosis, ankylosing spondylitis, sacroiliac joint inflammation, pancreatitis, perforated peptic ulcer, diverticulitis, endometriosis, chronic PID, epidural abscess, osteomyelitis, retroperitoneal abscess, pyelonephritis, pneumonia, subphrenic abscess, tuberculosis, neurofibroma, meningioma, multiple myeloma, lymphoma, metastatic cancer, primary cancer, AAA, aortic dissection, spinal ischemia, referred pain, ureterolithiasis  DIAGNOSIS  Final diagnoses:   Left flank pain       Latest Documented Vital Signs:  As of 18:36 EDT  BP- 140/80 HR- 65 Temp- 98.9 °F (37.2 °C) (Oral) O2 sat- 96%    DISPOSITION  Pt discharged    Discussed pertinent findings with the patient/family.  Patient/Family voiced understanding of need to follow-up for recheck and further testing as needed.  Return to the Emergency Department warnings were given.         Medication List      New Prescriptions    methocarbamol 750 MG tablet  Commonly known as: ROBAXIN  Take 1 tablet by mouth 3 (Three) Times a Day As Needed for Muscle Spasms for up to 5  days.           Where to Get Your Medications      These medications were sent to [id number]  [place] - [address] - [phone]  - [phone]  [address] [facility] [place] [address]    Phone: [phone]   · methocarbamol 750 MG tablet              Follow-up Information     Payton Salter APRN. Call today.    Specialty: Family Medicine  Why: to schedule follow up  Contact information:  [address]  [place] [id number] [phone]             Go to  [facility] Emergency Department.    Specialty: Emergency Medicine  Why: If symptoms worsen  Contact information:  [address] [place] [address] [place] [address] [phone]                         Dictated utilizing Dragon dictation     Magdalena Nicole PA-C  07/25/22 7565

## 2023-05-30 ENCOUNTER — APPOINTMENT (OUTPATIENT)
Dept: CT IMAGING | Facility: HOSPITAL | Age: 73
End: 2023-05-30
Payer: MEDICARE

## 2023-05-30 ENCOUNTER — APPOINTMENT (OUTPATIENT)
Dept: MRI IMAGING | Facility: HOSPITAL | Age: 73
End: 2023-05-30
Payer: MEDICARE

## 2023-05-30 ENCOUNTER — HOSPITAL ENCOUNTER (OUTPATIENT)
Facility: HOSPITAL | Age: 73
Setting detail: OBSERVATION
Discharge: HOME | End: 2023-06-01
Attending: EMERGENCY MEDICINE | Admitting: HOSPITALIST
Payer: MEDICARE

## 2023-05-30 ENCOUNTER — APPOINTMENT (OUTPATIENT)
Dept: CARDIOLOGY | Facility: HOSPITAL | Age: 73
End: 2023-05-30
Payer: MEDICARE

## 2023-05-30 DIAGNOSIS — I63.9 CEREBROVASCULAR ACCIDENT (CVA), UNSPECIFIED MECHANISM: Primary | ICD-10-CM

## 2023-05-30 DIAGNOSIS — I63.9 DYSARTHRIA DUE TO ACUTE STROKE: ICD-10-CM

## 2023-05-30 DIAGNOSIS — R41.841 COGNITIVE COMMUNICATION DEFICIT: ICD-10-CM

## 2023-05-30 DIAGNOSIS — R47.1 DYSARTHRIA DUE TO ACUTE STROKE: ICD-10-CM

## 2023-05-30 LAB
ALBUMIN SERPL-MCNC: 4.2 G/DL (ref 3.5–5.2)
ALBUMIN/GLOB SERPL: 1.4 G/DL
ALP SERPL-CCNC: 80 U/L (ref 39–117)
ALT SERPL W P-5'-P-CCNC: 8 U/L (ref 1–41)
ANION GAP SERPL CALCULATED.3IONS-SCNC: 10.4 MMOL/L (ref 5–15)
AORTIC DIMENSIONLESS INDEX: 1.1 (DI)
AST SERPL-CCNC: 13 U/L (ref 1–40)
BACTERIA UR QL AUTO: ABNORMAL /HPF
BASOPHILS # BLD AUTO: 0.09 10*3/MM3 (ref 0–0.2)
BASOPHILS NFR BLD AUTO: 1.3 % (ref 0–1.5)
BH CV ECHO MEAS - AO MAX PG: 5.4 MMHG
BH CV ECHO MEAS - AO MEAN PG: 3 MMHG
BH CV ECHO MEAS - AO V2 MAX: 116 CM/SEC
BH CV ECHO MEAS - AO V2 VTI: 28.1 CM
BH CV ECHO MEAS - AVA(I,D): 3.5 CM2
BH CV ECHO MEAS - EDV(CUBED): 125 ML
BH CV ECHO MEAS - EDV(MOD-SP2): 82 ML
BH CV ECHO MEAS - EDV(MOD-SP4): 109 ML
BH CV ECHO MEAS - EF(MOD-BP): 55 %
BH CV ECHO MEAS - EF(MOD-SP2): 41.5 %
BH CV ECHO MEAS - EF(MOD-SP4): 59.6 %
BH CV ECHO MEAS - ESV(CUBED): 44.4 ML
BH CV ECHO MEAS - ESV(MOD-SP2): 48 ML
BH CV ECHO MEAS - ESV(MOD-SP4): 44 ML
BH CV ECHO MEAS - FS: 29.2 %
BH CV ECHO MEAS - IVS/LVPW: 1 CM
BH CV ECHO MEAS - IVSD: 1.2 CM
BH CV ECHO MEAS - LAT PEAK E' VEL: 8.2 CM/SEC
BH CV ECHO MEAS - LV DIASTOLIC VOL/BSA (35-75): 50.1 CM2
BH CV ECHO MEAS - LV MASS(C)D: 233.7 GRAMS
BH CV ECHO MEAS - LV MAX PG: 6.7 MMHG
BH CV ECHO MEAS - LV MEAN PG: 4 MMHG
BH CV ECHO MEAS - LV SYSTOLIC VOL/BSA (12-30): 20.2 CM2
BH CV ECHO MEAS - LV V1 MAX: 129 CM/SEC
BH CV ECHO MEAS - LV V1 VTI: 29.7 CM
BH CV ECHO MEAS - LVIDD: 5 CM
BH CV ECHO MEAS - LVIDS: 3.5 CM
BH CV ECHO MEAS - LVOT AREA: 3.4 CM2
BH CV ECHO MEAS - LVOT DIAM: 2.07 CM
BH CV ECHO MEAS - LVPWD: 1.2 CM
BH CV ECHO MEAS - MED PEAK E' VEL: 7.3 CM/SEC
BH CV ECHO MEAS - MV A MAX VEL: 86.5 CM/SEC
BH CV ECHO MEAS - MV DEC SLOPE: 231 CM/SEC2
BH CV ECHO MEAS - MV DEC TIME: 0.3 MSEC
BH CV ECHO MEAS - MV E MAX VEL: 59.2 CM/SEC
BH CV ECHO MEAS - MV E/A: 0.68
BH CV ECHO MEAS - MV MAX PG: 3 MMHG
BH CV ECHO MEAS - MV MEAN PG: 0.84 MMHG
BH CV ECHO MEAS - MV P1/2T: 81.4 MSEC
BH CV ECHO MEAS - MV V2 VTI: 24.4 CM
BH CV ECHO MEAS - MVA(P1/2T): 2.7 CM2
BH CV ECHO MEAS - MVA(VTI): 4.1 CM2
BH CV ECHO MEAS - PA ACC TIME: 0.05 SEC
BH CV ECHO MEAS - PA PR(ACCEL): 55.2 MMHG
BH CV ECHO MEAS - PA V2 MAX: 116 CM/SEC
BH CV ECHO MEAS - QP/QS: 1.18
BH CV ECHO MEAS - RV MAX PG: 2.09 MMHG
BH CV ECHO MEAS - RV V1 MAX: 72.3 CM/SEC
BH CV ECHO MEAS - RV V1 VTI: 15.1 CM
BH CV ECHO MEAS - RVOT DIAM: 3.1 CM
BH CV ECHO MEAS - SI(MOD-SP2): 15.6 ML/M2
BH CV ECHO MEAS - SI(MOD-SP4): 29.9 ML/M2
BH CV ECHO MEAS - SV(LVOT): 99.5 ML
BH CV ECHO MEAS - SV(MOD-SP2): 34 ML
BH CV ECHO MEAS - SV(MOD-SP4): 65 ML
BH CV ECHO MEAS - SV(RVOT): 117.2 ML
BH CV ECHO MEASUREMENTS AVERAGE E/E' RATIO: 7.64
BH CV ECHO SHUNT ASSESSMENT PERFORMED (HIDDEN SCRIPTING): 1
BH CV XLRA - RV BASE: 3.2 CM
BH CV XLRA - RV LENGTH: 8.8 CM
BH CV XLRA - RV MID: 3.5 CM
BILIRUB SERPL-MCNC: 0.9 MG/DL (ref 0–1.2)
BILIRUB UR QL STRIP: NEGATIVE
BUN SERPL-MCNC: 11 MG/DL (ref 8–23)
BUN/CREAT SERPL: 9.6 (ref 7–25)
CALCIUM SPEC-SCNC: 9 MG/DL (ref 8.6–10.5)
CHLORIDE SERPL-SCNC: 105 MMOL/L (ref 98–107)
CHOLEST SERPL-MCNC: 142 MG/DL (ref 0–200)
CLARITY UR: CLEAR
CO2 SERPL-SCNC: 24.6 MMOL/L (ref 22–29)
COLOR UR: YELLOW
CREAT SERPL-MCNC: 1.14 MG/DL (ref 0.76–1.27)
DEPRECATED RDW RBC AUTO: 42.7 FL (ref 37–54)
EGFRCR SERPLBLD CKD-EPI 2021: 68.3 ML/MIN/1.73
EOSINOPHIL # BLD AUTO: 0.31 10*3/MM3 (ref 0–0.4)
EOSINOPHIL NFR BLD AUTO: 4.6 % (ref 0.3–6.2)
ERYTHROCYTE [DISTWIDTH] IN BLOOD BY AUTOMATED COUNT: 12.5 % (ref 12.3–15.4)
GLOBULIN UR ELPH-MCNC: 2.9 GM/DL
GLUCOSE SERPL-MCNC: 148 MG/DL (ref 65–99)
GLUCOSE UR STRIP-MCNC: NEGATIVE MG/DL
HBA1C MFR BLD: 6.4 % (ref 4.8–5.6)
HCT VFR BLD AUTO: 50.2 % (ref 37.5–51)
HDLC SERPL-MCNC: 42 MG/DL (ref 40–60)
HGB BLD-MCNC: 16.4 G/DL (ref 13–17.7)
HGB UR QL STRIP.AUTO: ABNORMAL
HYALINE CASTS UR QL AUTO: ABNORMAL /LPF
IMM GRANULOCYTES # BLD AUTO: 0.02 10*3/MM3 (ref 0–0.05)
IMM GRANULOCYTES NFR BLD AUTO: 0.3 % (ref 0–0.5)
KETONES UR QL STRIP: NEGATIVE
LDLC SERPL CALC-MCNC: 89 MG/DL (ref 0–100)
LDLC/HDLC SERPL: 2.15 {RATIO}
LEFT ATRIUM VOLUME INDEX: 33.9 ML/M2
LEUKOCYTE ESTERASE UR QL STRIP.AUTO: NEGATIVE
LYMPHOCYTES # BLD AUTO: 2.32 10*3/MM3 (ref 0.7–3.1)
LYMPHOCYTES NFR BLD AUTO: 34.4 % (ref 19.6–45.3)
MAXIMAL PREDICTED HEART RATE: 148 BPM
MCH RBC QN AUTO: 29.8 PG (ref 26.6–33)
MCHC RBC AUTO-ENTMCNC: 32.7 G/DL (ref 31.5–35.7)
MCV RBC AUTO: 91.1 FL (ref 79–97)
MONOCYTES # BLD AUTO: 0.65 10*3/MM3 (ref 0.1–0.9)
MONOCYTES NFR BLD AUTO: 9.6 % (ref 5–12)
NEUTROPHILS NFR BLD AUTO: 3.35 10*3/MM3 (ref 1.7–7)
NEUTROPHILS NFR BLD AUTO: 49.8 % (ref 42.7–76)
NITRITE UR QL STRIP: NEGATIVE
PH UR STRIP.AUTO: 7 [PH] (ref 4.5–8)
PLATELET # BLD AUTO: 291 10*3/MM3 (ref 140–450)
PMV BLD AUTO: 10.1 FL (ref 6–12)
POTASSIUM SERPL-SCNC: 4 MMOL/L (ref 3.5–5.2)
PROT SERPL-MCNC: 7.1 G/DL (ref 6–8.5)
PROT UR QL STRIP: NEGATIVE
RBC # BLD AUTO: 5.51 10*6/MM3 (ref 4.14–5.8)
RBC # UR STRIP: ABNORMAL /HPF
REF LAB TEST METHOD: ABNORMAL
SINUS: 3.8 CM
SODIUM SERPL-SCNC: 140 MMOL/L (ref 136–145)
SP GR UR STRIP: 1.02 (ref 1–1.03)
SQUAMOUS #/AREA URNS HPF: ABNORMAL /HPF
STRESS TARGET HR: 126 BPM
TRIGL SERPL-MCNC: 48 MG/DL (ref 0–150)
TROPONIN T SERPL HS-MCNC: 12 NG/L
TSH SERPL DL<=0.05 MIU/L-ACNC: 1.61 UIU/ML (ref 0.27–4.2)
UROBILINOGEN UR QL STRIP: ABNORMAL
VLDLC SERPL-MCNC: 11 MG/DL (ref 5–40)
WBC # UR STRIP: ABNORMAL /HPF
WBC NRBC COR # BLD: 6.74 10*3/MM3 (ref 3.4–10.8)

## 2023-05-30 PROCEDURE — 84443 ASSAY THYROID STIM HORMONE: CPT | Performed by: HOSPITALIST

## 2023-05-30 PROCEDURE — 83036 HEMOGLOBIN GLYCOSYLATED A1C: CPT | Performed by: HOSPITALIST

## 2023-05-30 PROCEDURE — 84484 ASSAY OF TROPONIN QUANT: CPT | Performed by: EMERGENCY MEDICINE

## 2023-05-30 PROCEDURE — 70450 CT HEAD/BRAIN W/O DYE: CPT

## 2023-05-30 PROCEDURE — G0378 HOSPITAL OBSERVATION PER HR: HCPCS

## 2023-05-30 PROCEDURE — 99285 EMERGENCY DEPT VISIT HI MDM: CPT

## 2023-05-30 PROCEDURE — 81001 URINALYSIS AUTO W/SCOPE: CPT | Performed by: EMERGENCY MEDICINE

## 2023-05-30 PROCEDURE — 93306 TTE W/DOPPLER COMPLETE: CPT

## 2023-05-30 PROCEDURE — 36415 COLL VENOUS BLD VENIPUNCTURE: CPT

## 2023-05-30 PROCEDURE — 93306 TTE W/DOPPLER COMPLETE: CPT | Performed by: INTERNAL MEDICINE

## 2023-05-30 PROCEDURE — 80061 LIPID PANEL: CPT | Performed by: HOSPITALIST

## 2023-05-30 PROCEDURE — 85025 COMPLETE CBC W/AUTO DIFF WBC: CPT | Performed by: EMERGENCY MEDICINE

## 2023-05-30 PROCEDURE — 99205 OFFICE O/P NEW HI 60 MIN: CPT | Performed by: STUDENT IN AN ORGANIZED HEALTH CARE EDUCATION/TRAINING PROGRAM

## 2023-05-30 PROCEDURE — 80053 COMPREHEN METABOLIC PANEL: CPT | Performed by: EMERGENCY MEDICINE

## 2023-05-30 PROCEDURE — 70551 MRI BRAIN STEM W/O DYE: CPT

## 2023-05-30 PROCEDURE — 99222 1ST HOSP IP/OBS MODERATE 55: CPT | Performed by: HOSPITALIST

## 2023-05-30 RX ORDER — SODIUM CHLORIDE 0.9 % (FLUSH) 0.9 %
10 SYRINGE (ML) INJECTION EVERY 12 HOURS SCHEDULED
Status: DISCONTINUED | OUTPATIENT
Start: 2023-05-30 | End: 2023-05-30

## 2023-05-30 RX ORDER — ASPIRIN 325 MG
325 TABLET ORAL DAILY
Status: DISCONTINUED | OUTPATIENT
Start: 2023-05-31 | End: 2023-05-31

## 2023-05-30 RX ORDER — ASPIRIN 81 MG/1
324 TABLET, CHEWABLE ORAL ONCE
Status: COMPLETED | OUTPATIENT
Start: 2023-05-30 | End: 2023-05-30

## 2023-05-30 RX ORDER — ASPIRIN 325 MG
325 TABLET ORAL DAILY
Status: DISCONTINUED | OUTPATIENT
Start: 2023-05-30 | End: 2023-05-30

## 2023-05-30 RX ORDER — ASPIRIN 300 MG/1
300 SUPPOSITORY RECTAL DAILY
Status: DISCONTINUED | OUTPATIENT
Start: 2023-05-31 | End: 2023-05-31

## 2023-05-30 RX ORDER — CLOPIDOGREL BISULFATE 75 MG/1
300 TABLET ORAL ONCE
Status: COMPLETED | OUTPATIENT
Start: 2023-05-30 | End: 2023-05-30

## 2023-05-30 RX ORDER — SODIUM CHLORIDE 9 MG/ML
40 INJECTION, SOLUTION INTRAVENOUS AS NEEDED
Status: DISCONTINUED | OUTPATIENT
Start: 2023-05-30 | End: 2023-05-30

## 2023-05-30 RX ORDER — SODIUM CHLORIDE 0.9 % (FLUSH) 0.9 %
10 SYRINGE (ML) INJECTION EVERY 12 HOURS SCHEDULED
Status: DISCONTINUED | OUTPATIENT
Start: 2023-05-30 | End: 2023-06-01 | Stop reason: HOSPADM

## 2023-05-30 RX ORDER — SODIUM CHLORIDE 9 MG/ML
40 INJECTION, SOLUTION INTRAVENOUS AS NEEDED
Status: DISCONTINUED | OUTPATIENT
Start: 2023-05-30 | End: 2023-06-01 | Stop reason: HOSPADM

## 2023-05-30 RX ORDER — NITROGLYCERIN 0.4 MG/1
0.4 TABLET SUBLINGUAL
Status: DISCONTINUED | OUTPATIENT
Start: 2023-05-30 | End: 2023-06-01 | Stop reason: HOSPADM

## 2023-05-30 RX ORDER — ASPIRIN 81 MG/1
81 TABLET, CHEWABLE ORAL DAILY
Status: DISCONTINUED | OUTPATIENT
Start: 2023-05-30 | End: 2023-05-30

## 2023-05-30 RX ORDER — CLOPIDOGREL BISULFATE 75 MG/1
75 TABLET ORAL DAILY
Status: DISCONTINUED | OUTPATIENT
Start: 2023-05-31 | End: 2023-05-31

## 2023-05-30 RX ORDER — ASPIRIN 300 MG/1
300 SUPPOSITORY RECTAL DAILY
Status: DISCONTINUED | OUTPATIENT
Start: 2023-05-30 | End: 2023-05-30

## 2023-05-30 RX ORDER — SODIUM CHLORIDE 0.9 % (FLUSH) 0.9 %
10 SYRINGE (ML) INJECTION AS NEEDED
Status: DISCONTINUED | OUTPATIENT
Start: 2023-05-30 | End: 2023-05-30

## 2023-05-30 RX ORDER — ATORVASTATIN CALCIUM 40 MG/1
80 TABLET, FILM COATED ORAL NIGHTLY
Status: DISCONTINUED | OUTPATIENT
Start: 2023-05-30 | End: 2023-06-01 | Stop reason: HOSPADM

## 2023-05-30 RX ORDER — SODIUM CHLORIDE 0.9 % (FLUSH) 0.9 %
10 SYRINGE (ML) INJECTION AS NEEDED
Status: DISCONTINUED | OUTPATIENT
Start: 2023-05-30 | End: 2023-06-01 | Stop reason: HOSPADM

## 2023-05-30 RX ORDER — NITROGLYCERIN 0.4 MG/1
0.4 TABLET SUBLINGUAL
Status: DISCONTINUED | OUTPATIENT
Start: 2023-05-30 | End: 2023-05-30

## 2023-05-30 RX ORDER — HYDRALAZINE HYDROCHLORIDE 20 MG/ML
10 INJECTION INTRAMUSCULAR; INTRAVENOUS EVERY 6 HOURS PRN
Status: DISCONTINUED | OUTPATIENT
Start: 2023-05-30 | End: 2023-06-01 | Stop reason: HOSPADM

## 2023-05-30 RX ADMIN — Medication 10 ML: at 20:53

## 2023-05-30 RX ADMIN — CLOPIDOGREL BISULFATE 300 MG: 75 TABLET ORAL at 16:32

## 2023-05-30 RX ADMIN — ASPIRIN 81 MG CHEWABLE TABLET 324 MG: 81 TABLET CHEWABLE at 16:32

## 2023-05-30 RX ADMIN — ATORVASTATIN CALCIUM 80 MG: 40 TABLET, FILM COATED ORAL at 20:53

## 2023-05-30 NOTE — CONSULTS
[facility]   Teleneurology Note    Patient Name: Cory Hardwick  : 1950  MRN: [id number]  Primary Care Physician: Payton Salter APRN  Referring Site: [place]  Location of Neurologist: [place]    Subjective   Teleneurology Initial Data     Arrival Date Telestroke Site: 23 Arrival Time Telestroke Site: 0956   Neurologist Evaluation Date: 23     Last Known Well: Date Unknown, Time Unknown       History     Chief Complaint: dysarthria  HPI: 72 male  woke up with symptoms of speech difficulty yesterday. He has been having progressive decline over the past few weeks. He lives with a room mate. Able to carry all the ADLs. Has been having some falls, and involunatry bowel movements.    Stroke Risk Factors/ Pertinent Data     Stroke risk factors: none  Anticoagulants prior to arrival: not applicable  Antiplatelets prior to arrival: aspirin  Statins prior to arrival: not applicable     Scoring Scales     Pre-Stroke Modified Bertrand Scale: 4 - Moderately severe disability.  Unable to walk without assistance, and unable to attend to own bodily needs without assistance.Ernestine Coma Scale  Best Eye Response: Spontaneous  Best Verbal Response: Oriented  Best Motor Response: Follows commands  [place] Coma Scale Score: 15Intracerebral Hemmorhage (ICH) Score  Ernestine Coma Score: 13-15  Age>=80: no     NIH Stroke Scale     NIHSS Performed Date: 23 NIHSS Performed Time: 1558   Interval: baseline  1a. Level of Consciousness: 0-->Alert, keenly responsive  1b. LOC Questions: 0-->Answers both questions correctly  1c. LOC Commands: 0-->Performs both tasks correctly  2. Best Gaze: 0-->Normal  3. Visual: 0-->No visual loss  4. Facial Palsy: 0-->Normal symmetrical movements  5a. Motor Arm, Left: 0-->No drift, limb holds 90 (or 45) degrees for full 10 secs  5b. Motor Arm, Right: 0-->No drift, limb holds 90 (or 45) degrees for full 10 secs  6a. Motor Leg, Left: 0-->No drift, leg holds 30 degree position for full  5 secs  6b. Motor Leg, Right: 0-->No drift, leg holds 30 degree position for full 5 secs  7. Limb Ataxia: 0-->Absent  8. Sensory: 0-->Normal, no sensory loss  9. Best Language: 0-->No aphasia, normal  10. Dysarthria: 1-->Mild-to-moderate dysarthria, patient slurs at least some words and, at worst, can be understood with some difficulty  11. Extinction and Inattention (formerly Neglect): 0-->No abnormality  Total (NIH Stroke Scale): 1     Review of Systems     Review of Systems  Constitutional: No fatigue  HENT: Negative for nosebleeds and rhinorrhea.    Eyes: Negative for redness.   Respiratory: Negative for cough.    Gastrointestinal: Negative for anal bleeding.   Endocrine: Negative for polydipsia.   Genitourinary: Negative for enuresis and urgency.   Musculoskeletal: Negative for joint swelling.   Neurological: Negative for tremors.   Psychiatric/Behavioral: Negative for hallucinations.   Objective   Exam     Exam performed with the help of support staff from the referring site  Neurological Exam  NEURO( Exam is performed with help of hospital staff at bed side and observed by me via audio-video interface)    MENTAL STATUS: alert, oriented     LANG/SPEECH: slurred speech      CRANIAL NERVES:    Pupils equal and reactive, EOMI intact, no gaze deviation, no nystagmus  No facial droop, cough and gag intact, shoulder shrug intact, tongue midline     MOTOR:  Moves all extremities equally    SENSORY: Normal to light touch all throughout     COORDINATION: Normal finger to nose and heel to shin, no tremor, no dysmetria    STATION: Not assessed due to patient condition    GAIT: Not assessed due to patient condition  Results          Personal review of CNS imaging:(Official report by radiologist pending)  ASPECTS Involved Locations: none  ASPECTS Score: 10    Thrombolytic   Thrombolytics: not applicable     Assessment & Plan   Assessment/ Plan     Assessment:  Acute Stroke Evaluation: Acute ishemic stroke   Small lacunar  infarct does not explain other chronic ongoing issues  Need stroke evaluation, recommend admission       Plan:  I will initiate stroke order set   Plavix 300, load followed by 75  Aspirin 81 every day  Normal blood pressure goals   Lipitor of 40 for now  Will check A1C and lipid panel.            Disposition     Disposition: The patient will remain at the referring institution for further evaluation and management    Medical Decision Making  Medical Data Reviewed: Data reviewed including: clinical labs, radiology and/or medical tests, Independent review of CNS images    IRene MD, saw the patient on 05/30/23 at   for an initial in-patient or emergency room telememedicine face to face consult using interactive technology for  . The location of the patient was [place]. I was located at [place].    I have proceeded with this evaluation at the request of the referring practitioner as it is felt to be an emergency setting and no appropriate specialist is available to perform this evaluation. The originating hospital has reported that this is the correct patient and has obtained consent from the patient/surrogate to perform this telemedicine evaluation(including obtaining history, performing examination and reviewing data provided by the patient an/or originating site of care provider)    I have introduced myself to the patient, provided my credentials, disclosed my location, and determined that, based on review of the patient's chart and discussion with the patient's primary team, telemedicine via a HIPAA compliant, real-time, face-to-face two-way, interactive audio and video platform is an appropriate and effective means of providing the service.    The patient/surrogate has a right to refuse this evaluation as they have been explained risks including potential loss of confidentiality, benefits, alternatives, and the potential need for subsequent face-to-face care. In this evaluation, we will be  providing recommendations only.  The ultimate decision to follow or not to follow these recommendations will be left to the bedside treating/requesting practitioner.    The patient/surrogate has been notified that other healthcare professionals including technical person may be involved in this A/V evaluation.  All laws concerning confidentiality and patient access to medical records and copies of medical records apply to telemedicine.  The patient/surrogate has received the originating site's Health Notice of Privacy Practices.    Rene Lopez MD

## 2023-05-30 NOTE — H&P
[facility] HOSPITALIST     Payton Salter APRN    CHIEF COMPLAINT: Slurred Speech    HISTORY OF PRESENT ILLNESS:    Mr. Hardwick is a pleasant, 73 y/o  male who was noticed to have slurred speech on Saturday morning.  Mr. Hardwick himself does not recall this, but his son, who is at the bedside describes being called and told that he should call his father because of his speech.  His son does report that his speech was slurred.  Later that day, he took him to [place] to go shopping.  He reports that his father will always walk around the store, but this time he rode.  He also reports some chronic weakness from a prior hip repair.  No events were noted on Sunday, but at [employer] lunch, a nurse-friend noticed a slight facial droop.  They called his PCP this morning who told them to bring Mr. Hardwick to the ER.    His son does also describe some confusion that seems to wax and wane as he will ask the same question multiple times.  However, he doesn't describe this as an acutely different change.  He also doesn't seem to manage his medications or take them correctly.  He does not routinely check his blood glucose.       Past Medical History:   Diagnosis Date   • Balance disorder    • Diabetes mellitus    • Dizziness    • Fall    • GERD (gastroesophageal reflux disease)    • Hypertension    • Low vitamin D level    • Memory loss    • Peripheral neuropathy      Past Surgical History:   Procedure Laterality Date   • JOINT REPLACEMENT      right hip    • JOINT REPLACEMENT      knee      History reviewed. No pertinent family history.  Social History     Tobacco Use   • Smoking status: Never   • Smokeless tobacco: Never   Vaping Use   • Vaping Use: Never used   Substance Use Topics   • Alcohol use: Not Currently   • Drug use: Never     Medications Prior to Admission   Medication Sig Dispense Refill Last Dose   • amLODIPine (NORVASC) 5 MG tablet Take 5 mg by mouth Daily.      • atorvastatin (LIPITOR) 20  "MG tablet Take 20 mg by mouth Daily.      • fluticasone (FLONASE) 50 MCG/ACT nasal spray 1 spray into the nostril(s) as directed by provider Daily.      • furosemide (LASIX) 20 MG tablet       • losartan (COZAAR) 100 MG tablet Take 100 mg by mouth Daily.      • meclizine (ANTIVERT) 25 MG tablet Take 1 tablet by mouth 3 (Three) Times a Day As Needed for Dizziness or Nausea for up to 30 doses. 30 tablet 0    • metFORMIN ER (GLUCOPHAGE-XR) 750 MG 24 hr tablet Take 750 mg by mouth Daily.      • pantoprazole (PROTONIX) 40 MG EC tablet Take 40 mg by mouth Daily.      • tamsulosin (FLOMAX) 0.4 MG capsule 24 hr capsule Take 0.4 mg by mouth Daily.        Allergies:  Antazoline, Hydrocodone-acetaminophen, and Metformin    REVIEW OF SYSTEMS:  Please see the above history of present illness for pertinent positives and negatives.  The remainder of the patient's systems have been reviewed and are negative.    Vital Signs  Temp:  [97.9 °F (36.6 °C)-98.9 °F (37.2 °C)] 97.9 °F (36.6 °C)  Heart Rate:  [57-78] 60  Resp:  [16-20] 19  BP: (171-190)/(78-98) 179/95  Oxygen Therapy  SpO2: 95 %  Device (Oxygen Therapy): room air}  Body mass index is 29.39 kg/m².  Flowsheet Rows    Flowsheet Row First Filed Value   Admission Height 182.9 cm (72\") Documented at 05/30/2023 1004   Admission Weight 96.4 kg (212 lb 8 oz) Documented at 05/30/2023 1004             Physical Exam:  Physical Exam   Constitutional: Patient appears well developed and well nourished and in no acute distress   HEENT:   Head: Normocephalic and atraumatic.   Eyes:  Pupils are equal, round, and reactive to light. EOM are intact. Sclerae are anicteric and noninjected.  Mouth and Throat: Patient has moist mucous membranes. Oropharynx is clear of any erythema or exudate.     Neck: Neck supple. No JVD present. No thyromegaly present. No lymphadenopathy present.  Cardiovascular: Regular rate, regular rhythm, S1 normal and S2 normal.  Exam reveals no gallop and no friction rub.  " No murmur heard.  Radial pulses are 2+ and symmetric.  Pulmonary/Chest: Lungs are clear to auscultation bilaterally. No respiratory distress. No wheezes. No rhonchi. No rales.   Abdominal: Soft. Bowel sounds are normal. There is no tenderness.   Musculoskeletal: Normal muscle tone.  LLE is slighter weaker than the RLE.  BUE are symmetric in strength.  Extremities: No edema.   Neurological: No facial asymmetry.  Normal tone.  Skin: Skin is warm. No rash noted. Nails show no clubbing.  No cyanosis or erythema.    Emotional Behavior:    Judgment and Insight: Average   Mental Status:  Alertness  Normal   Memory:  Average   Mood and Affect:         Depression  None               Anxiety  None    Debilities:   Physical Weakness  As per HPI   Handicaps  None   Disabilities  None   Agitation  None     Results Review:    I reviewed the patient's new clinical results.  Lab Results (most recent)     Procedure Component Value Units Date/Time    Hemoglobin A1c [[id number]]  (Abnormal) Collected: 05/30/23 1036    Specimen: Blood Updated: 05/30/23 1813     Hemoglobin A1C 6.40 %     Narrative:      Hemoglobin A1C Ranges:    Increased Risk for Diabetes  5.7% to 6.4%  Diabetes                     >= 6.5%  Diabetic Goal                < 7.0%    Lipid Panel [[id number]] Collected: 05/30/23 1036    Specimen: Blood Updated: 05/30/23 1809     Total Cholesterol 142 mg/dL      Triglycerides 48 mg/dL      HDL Cholesterol 42 mg/dL      LDL Cholesterol  89 mg/dL      VLDL Cholesterol 11 mg/dL      LDL/HDL Ratio 2.15    Narrative:      Cholesterol Reference Ranges  (U.S. Department of Health and Human Services ATP III Classifications)    Desirable          <200 mg/dL  Borderline High    200-239 mg/dL  High Risk          >240 mg/dL      Triglyceride Reference Ranges  (U.S. Department of Health and Human Services ATP III Classifications)    Normal           <150 mg/dL  Borderline High  150-199 mg/dL  High             200-499 mg/dL  Very High         >500 mg/dL    HDL Reference Ranges  (U.S. Department of Health and Human Services ATP III Classifications)    Low     <40 mg/dl (major risk factor for CHD)  High    >60 mg/dl ('negative' risk factor for CHD)        LDL Reference Ranges  (U.S. Department of Health and Human Services ATP III Classifications)    Optimal          <100 mg/dL  Near Optimal     100-129 mg/dL  Borderline High  130-159 mg/dL  High             160-189 mg/dL  Very High        >189 mg/dL    Urinalysis, Microscopic Only - Urine, Clean Catch [[id number]]  (Abnormal) Collected: 05/30/23 1131    Specimen: Urine, Clean Catch Updated: 05/30/23 1202     RBC, UA 3-5 /HPF      WBC, UA None Seen /HPF      Bacteria, UA None Seen /HPF      Squamous Epithelial Cells, UA 0-2 /HPF      Hyaline Casts, UA 0-2 /LPF      Methodology Manual Light Microscopy    Urinalysis With Microscopic If Indicated (No Culture) - Urine, Clean Catch [[id number]]  (Abnormal) Collected: 05/30/23 1131    Specimen: Urine, Clean Catch Updated: 05/30/23 1142     Color, UA Yellow     Appearance, UA Clear     pH, UA 7.0     Specific Gravity, UA 1.020     Glucose, UA Negative     Ketones, UA Negative     Bilirubin, UA Negative     Blood, UA Small (1+)     Protein, UA Negative     Leuk Esterase, UA Negative     Nitrite, UA Negative     Urobilinogen, UA 0.2 E.U./dL    Single High Sensitivity Troponin T [[id number]]  (Normal) Collected: 05/30/23 1036    Specimen: Blood Updated: 05/30/23 1103     HS Troponin T 12 ng/L     Narrative:      High Sensitive Troponin T Reference Range:  <10.0 ng/L- Negative Female for AMI  <15.0 ng/L- Negative Male for AMI  >=10 - Abnormal Female indicating possible myocardial injury.  >=15 - Abnormal Male indicating possible myocardial injury.   Clinicians would have to utilize clinical acumen, EKG, Troponin, and serial changes to determine if it is an Acute Myocardial Infarction or myocardial injury due to an underlying chronic condition.         Comprehensive  Metabolic Panel [[id number]]  (Abnormal) Collected: 05/30/23 1036    Specimen: Blood Updated: 05/30/23 1101     Glucose 148 mg/dL      BUN 11 mg/dL      Creatinine 1.14 mg/dL      Sodium 140 mmol/L      Potassium 4.0 mmol/L      Chloride 105 mmol/L      CO2 24.6 mmol/L      Calcium 9.0 mg/dL      Total Protein 7.1 g/dL      Albumin 4.2 g/dL      ALT (SGPT) 8 U/L      AST (SGOT) 13 U/L      Alkaline Phosphatase 80 U/L      Total Bilirubin 0.9 mg/dL      Globulin 2.9 gm/dL      A/G Ratio 1.4 g/dL      BUN/Creatinine Ratio 9.6     Anion Gap 10.4 mmol/L      eGFR 68.3 mL/min/1.73     Narrative:      GFR Normal >60  Chronic Kidney Disease <60  Kidney Failure <15    The GFR formula is only valid for adults with stable renal function between ages 18 and 70.    CBC & Differential [[id number]]  (Normal) Collected: 05/30/23 1036    Specimen: Blood Updated: 05/30/23 1046    Narrative:      The following orders were created for panel order CBC & Differential.  Procedure                               Abnormality         Status                     ---------                               -----------         ------                     CBC Auto Differential[[id number]]        Normal              Final result                 Please view results for these tests on the individual orders.    CBC Auto Differential [[id number]]  (Normal) Collected: 05/30/23 1036    Specimen: Blood Updated: 05/30/23 1046     WBC 6.74 10*3/mm3      RBC 5.51 10*6/mm3      Hemoglobin 16.4 g/dL      Hematocrit 50.2 %      MCV 91.1 fL      MCH 29.8 pg      MCHC 32.7 g/dL      RDW 12.5 %      RDW-SD 42.7 fl      MPV 10.1 fL      Platelets 291 10*3/mm3      Neutrophil % 49.8 %      Lymphocyte % 34.4 %      Monocyte % 9.6 %      Eosinophil % 4.6 %      Basophil % 1.3 %      Immature Grans % 0.3 %      Neutrophils, Absolute 3.35 10*3/mm3      Lymphocytes, Absolute 2.32 10*3/mm3      Monocytes, Absolute 0.65 10*3/mm3      Eosinophils, Absolute 0.31 10*3/mm3       Basophils, Absolute 0.09 10*3/mm3      Immature Grans, Absolute 0.02 10*3/mm3           Imaging Results (Most Recent)     Procedure Component Value Units Date/Time    MRI Brain Without Contrast [[id number]] Collected: 05/30/23 1435     Updated: 05/30/23 1518    Narrative:      MRI BRAIN, 05/30/2023         HISTORY:  72-year-old male presenting to the ED with 2-3 day history of unsteady  gait, slurred speech. Past history of CVA.     TECHNIQUE:  Multisequence, multiplanar noncontrast MR imaging of the brain.     COMPARISON:  *  CT head, today.  *  MRI brain, 06/23/2021.     FINDINGS:  Single tiny focus of mildly restricted diffusion in the left  periventricular parietal centrum semiovale measuring about 6 mm, not  present on the prior exam. Likely focus of subacute ischemic insult.     No additional evidence of acute or subacute ischemia or other restricted  diffusion.     No evidence of acute intracranial hemorrhage.     Old left occipital lobe cortical and subcortical infarct in the PCA  territory, unchanged. Chronic multifocal lacunar infarcts in the basal  ganglia bilaterally. Severe diffuse chronic small vessel type white  matter changes, nonspecific but stable since the prior study.     Chronic generalized cerebral volume loss, greatest in the frontotemporal  regions, unchanged. Major cerebrovascular flow voids are preserved.       Impression:      1.  Tiny, likely subacute white matter infarct in the deep left parietal  white matter measuring about 6 mm, not hemorrhagic.  2.  No other evidence of acute or subacute ischemia or additional  restricted diffusion.  3.  Stable old left occipital infarct. Old bilateral basal ganglia  lacunar infarcts.  4.  Stable diffuse chronic changes as noted above.     This report was finalized on 5/30/2023 2:45 PM by Dr. Cory Schaeffer MD.       CT Head Without Contrast [[id number]] Collected: 05/30/23 1105     Updated: 05/30/23 1257    Narrative:      CT HEAD, NONCONTRAST,  05/30/2023     HISTORY:  72-year-old male in the ED with 2-3 day history of unsteady gait,  slurred speech. Past history of CVA.     TECHNIQUE:    CT imaging of the head without IV contrast. Radiation dose reduction  techniques included automated exposure control or exposure modulation  based on body size. Radiation audit for CT and nuclear cardiology exams  in the last 12 months: 1.     COMPARISON:  *  CT head, 05/04/2021.     FINDINGS:    No acute intracranial abnormality is demonstrated.     Advanced generalized chronic cerebral volume loss, greatest over the  frontotemporal lobes where there are small chronic low-attenuation  subdural hygromas. Old midline left occipital infarct in PCA  distribution. Severe diffuse chronic small vessel type white matter  changes. Old lacunar infarcts in bilateral basal ganglia. These findings  are stable since the prior study.     No evidence of acute intracranial hemorrhage. No visible mass, mass  effect, convincing acute cerebral edema, enlarging extra-axial fluid  collection or progressive ventricular enlargement. Visualized upper  paranasal sinuses and mastoid air spaces are clear.       Impression:      1.  No acute intracranial abnormality. No evidence of acute intracranial  hemorrhage.  2.  Advanced atrophy, greater over the frontotemporal lobes where there  are small chronic low attenuation subdural hygromas that are stable.  3.  Old left occipital infarct. Old bilateral basal ganglia lacunar  infarcts. Extensive chronic white matter changes.  4.  No significant change since 05/04/2021.     This report was finalized on 5/30/2023 11:10 AM by Dr. Cory Schaeffer MD.           reviewed      Assessment & Plan     1.  Acute Left Parietal CVA: I'm wondering if this may have been emboli event since it sounds like he woke up like this on Saturday morning.  His lipid panel really isn't bad.  He had previously been on Metformin for pre-diabetes, but stopped it due to  side-effects.  His echo is done, but not read.  Will continue work-up in the morning.  Continue DAPT.  Although he technically doesn't need a >50% reduction in LDL, I'm going to keep the current 80mg of Lipitor in place given other findings on the MRI.  Allow for permissive HTN.  PT/OT eval in A.M.    2.  Pre-diabetes: As in #1.  His A1c was 6.4%.  Will follow accuchecks.  Check TSH.    3.  Essential Hypertension: It appears he takes Norvasc and Losartan at home.  Permissive HTN as noted above.  Add Hydralazine prn for SBP>180mmHg for now.    I discussed the patient's findings and my recommendations with patient and son w/ permission.     Emery Tom MD  05/30/23  18:30 EDT

## 2023-05-30 NOTE — ED PROVIDER NOTES
"Subjective   History of Present Illness  Patient with history of previous CVA, diabetes balance disorder presents emergency department due to family noticing speech difficulty and increased confusion over the last 2 to 3 days.  Initial symptoms were noted greater than 48 hours ago during a phone call.  Patient was subsequently brought to his son's home for \"observation\".  There is concerned about facial asymmetry and some left-sided weakness.  Patient states that his speech is different and he is having difficulty ambulating.  Patient often ambulates with a cane.    History provided by:  Patient  Neurologic Problem  The patient's primary symptoms include weakness. Associated symptoms include confusion. Pertinent negatives include no abdominal pain or chest pain.       Review of Systems   Constitutional: Negative for chills.   Cardiovascular: Negative for chest pain.   Gastrointestinal: Negative for abdominal pain.   Neurological: Positive for facial asymmetry, speech difficulty and weakness.   Psychiatric/Behavioral: Positive for confusion.       Past Medical History:   Diagnosis Date   • Balance disorder    • Diabetes mellitus    • Dizziness    • Fall    • GERD (gastroesophageal reflux disease)    • Hypertension    • Low vitamin D level    • Memory loss    • Peripheral neuropathy        Allergies   Allergen Reactions   • Antazoline Other (See Comments)   • Hydrocodone-Acetaminophen Itching   • Metformin Headache       Past Surgical History:   Procedure Laterality Date   • JOINT REPLACEMENT      right hip    • JOINT REPLACEMENT      knee        No family history on file.    Social History     Socioeconomic History   • Marital status: Single   Tobacco Use   • Smoking status: Never   • Smokeless tobacco: Never   Vaping Use   • Vaping Use: Never used   Substance and Sexual Activity   • Alcohol use: Not Currently   • Drug use: Never   • Sexual activity: Defer           Objective   Physical Exam  Vitals and nursing note " reviewed.   Constitutional:       Appearance: Normal appearance.   HENT:      Head: Normocephalic and atraumatic.   Eyes:      Extraocular Movements: Extraocular movements intact.      Pupils: Pupils are equal, round, and reactive to light.   Cardiovascular:      Rate and Rhythm: Normal rate and regular rhythm.   Pulmonary:      Effort: Pulmonary effort is normal.      Breath sounds: Normal breath sounds.   Abdominal:      General: Abdomen is flat.      Palpations: Abdomen is soft.   Musculoskeletal:         General: Normal range of motion.      Cervical back: Normal range of motion and neck supple.   Skin:     General: Skin is warm and dry.   Neurological:      General: No focal deficit present.      Mental Status: He is alert and oriented to person, place, and time.      Cranial Nerves: Dysarthria present.      Gait: Gait abnormal.      Comments: Slurred speech   Psychiatric:         Mood and Affect: Mood normal.         Behavior: Behavior normal.         Procedures           ED Course  ED Course as of 05/30/23 1525   Tue May 30, 2023   1257 The urinalysis shows 3-5 red blood cells, no white blood cells, no bacteria, leukocyte nitrite negative.  Urinalysis is otherwise unremarkable.    High-sensitivity troponin is 12 and normal.    The serum glucose is 148, CMP is otherwise unremarkable    The CBC is unremarkable. [RC]      ED Course User Index  [RC] Terrell Anderson MD      Lab Results (last 24 hours)     ** No results found for the last 24 hours. **             CT Head Without Contrast    (Results Pending)     15:25 EDT, 05/30/23:  I spoke with Dr. Alfonso, on-call for stroke neurology, he will see the patient     16:04 EDT, 05/30/23:  Dr. Alfonso, on-call for stroke neurology, has seen the patient, he recommends admitting the patient, Plavix 30 mg p.o. for 3 weeks, and aspirin.  He also recommends echocardiogram.  He would also like the patient to get a MRA of head neck.    16:11 EDT, 05/30/23:  The  hospitalist has been paged out for    16:11 EDT, 05/30/23:  The patient's diagnosis of CVA was discussed with the patient and son.  The patient will be admitted for further work-up and evaluation.    16:15 EDT, 05/30/23:  I spoke with Dr. Tom, on-call for the hospitalist, he will bring the patient in for observation                           MDM   Care transferred at shift change pending mri and dispo @12:00.    Final diagnoses:   Cerebrovascular accident (CVA), unspecified mechanism       ED Disposition  ED Disposition     None          No follow-up provider specified.       Medication List      No changes were made to your prescriptions during this visit.             Johan Swain MD  05/30/23 8491       Terrell Anderson MD  05/30/23 1578

## 2023-05-30 NOTE — ED NOTES
Nursing report ED to floor  Cory Hardwick  72 y.o.  male    HPI :   Chief Complaint   Patient presents with    Speech Problem     Last known normal per son 5-6 days ago, slurred speech per pt started 2 day ago       Admitting doctor:   Emery Tom MD    Admitting diagnosis:   The encounter diagnosis was Cerebrovascular accident (CVA), unspecified mechanism.    Code status:   Current Code Status       Date Active Code Status Order ID Comments User Context       Not on file            Allergies:   Antazoline, Hydrocodone-acetaminophen, and Metformin    Isolation:   No active isolations    Intake and Output  No intake or output data in the 24 hours ending 05/30/23 1636    Weight:       05/30/23  1004   Weight: 96.4 kg (212 lb 8 oz)       Most recent vitals:   Vitals:    05/30/23 1312 05/30/23 1424 05/30/23 1624 05/30/23 1627   BP: 171/78 171/78  (!) 190/95   Pulse: 60 57 60 65   Resp: 18 20  18   Temp:       TempSrc:       SpO2: 95% 96%  96%   Weight:       Height:           Active LDAs/IV Access:   Lines, Drains & Airways       Active LDAs       Name Placement date Placement time Site Days    Peripheral IV 05/30/23 1009 Left Antecubital 05/30/23  1009  Antecubital  less than 1                    Labs (abnormal labs have a star):   Labs Reviewed   URINALYSIS W/ MICROSCOPIC IF INDICATED (NO CULTURE) - Abnormal; Notable for the following components:       Result Value    Blood, UA Small (1+) (*)     All other components within normal limits   COMPREHENSIVE METABOLIC PANEL - Abnormal; Notable for the following components:    Glucose 148 (*)     All other components within normal limits    Narrative:     GFR Normal >60  Chronic Kidney Disease <60  Kidney Failure <15    The GFR formula is only valid for adults with stable renal function between ages 18 and 70.   URINALYSIS, MICROSCOPIC ONLY - Abnormal; Notable for the following components:    RBC, UA 3-5 (*)     All other components within normal limits   SINGLE  HSTROPONIN T - Normal    Narrative:     High Sensitive Troponin T Reference Range:  <10.0 ng/L- Negative Female for AMI  <15.0 ng/L- Negative Male for AMI  >=10 - Abnormal Female indicating possible myocardial injury.  >=15 - Abnormal Male indicating possible myocardial injury.   Clinicians would have to utilize clinical acumen, EKG, Troponin, and serial changes to determine if it is an Acute Myocardial Infarction or myocardial injury due to an underlying chronic condition.        CBC WITH AUTO DIFFERENTIAL - Normal   POCT GLUCOSE FINGERSTICK   POCT GLUCOSE FINGERSTICK   POCT GLUCOSE FINGERSTICK   POCT GLUCOSE FINGERSTICK   CBC AND DIFFERENTIAL    Narrative:     The following orders were created for panel order CBC & Differential.  Procedure                               Abnormality         Status                     ---------                               -----------         ------                     CBC Auto Differential[[id number]]        Normal              Final result                 Please view results for these tests on the individual orders.       EKG:   No orders to display       Meds given in ED:   Medications   nitroglycerin (NITROSTAT) SL tablet 0.4 mg (has no administration in time range)   sodium chloride 0.9 % flush 10 mL (has no administration in time range)   sodium chloride 0.9 % flush 10 mL (has no administration in time range)   sodium chloride 0.9 % infusion 40 mL (has no administration in time range)   aspirin suppository 300 mg (has no administration in time range)   clopidogrel (PLAVIX) tablet 300 mg (300 mg Oral Given 5/30/23 1632)     And   clopidogrel (PLAVIX) tablet 75 mg (has no administration in time range)   atorvastatin (LIPITOR) tablet 80 mg (has no administration in time range)   aspirin chewable tablet 324 mg (324 mg Oral Given 5/30/23 1632)       Imaging results:  CT Head Without Contrast    Result Date: 5/30/2023  1.  No acute intracranial abnormality. No evidence of acute  intracranial hemorrhage. 2.  Advanced atrophy, greater over the frontotemporal lobes where there are small chronic low attenuation subdural hygromas that are stable. 3.  Old left occipital infarct. Old bilateral basal ganglia lacunar infarcts. Extensive chronic white matter changes. 4.  No significant change since 05/04/2021.  This report was finalized on 5/30/2023 11:10 AM by Dr. Cory Schaeffer MD.      MRI Brain Without Contrast    Result Date: 5/30/2023  1.  Tiny, likely subacute white matter infarct in the deep left parietal white matter measuring about 6 mm, not hemorrhagic. 2.  No other evidence of acute or subacute ischemia or additional restricted diffusion. 3.  Stable old left occipital infarct. Old bilateral basal ganglia lacunar infarcts. 4.  Stable diffuse chronic changes as noted above.  This report was finalized on 5/30/2023 2:45 PM by Dr. Cory Schaeffer MD.       Ambulatory status:   Up with assist    Social issues:   Social History     Socioeconomic History    Marital status: Single   Tobacco Use    Smoking status: Never    Smokeless tobacco: Never   Vaping Use    Vaping Use: Never used   Substance and Sexual Activity    Alcohol use: Not Currently    Drug use: Never    Sexual activity: Defer       NIH Stroke Scale:  Interval: baseline      Jess Tapia RN  05/30/23 16:36 EDT

## 2023-05-31 ENCOUNTER — APPOINTMENT (OUTPATIENT)
Dept: MRI IMAGING | Facility: HOSPITAL | Age: 73
End: 2023-05-31
Payer: MEDICARE

## 2023-05-31 ENCOUNTER — APPOINTMENT (OUTPATIENT)
Dept: ULTRASOUND IMAGING | Facility: HOSPITAL | Age: 73
End: 2023-05-31
Payer: MEDICARE

## 2023-05-31 LAB
GLUCOSE BLDC GLUCOMTR-MCNC: 88 MG/DL (ref 70–130)
GLUCOSE BLDC GLUCOMTR-MCNC: 90 MG/DL (ref 70–130)
QT INTERVAL: 442 MS

## 2023-05-31 PROCEDURE — 70549 MR ANGIOGRAPH NECK W/O&W/DYE: CPT

## 2023-05-31 PROCEDURE — 99232 SBSQ HOSP IP/OBS MODERATE 35: CPT | Performed by: HOSPITALIST

## 2023-05-31 PROCEDURE — 0 GADOBENATE DIMEGLUMINE 529 MG/ML SOLUTION: Performed by: HOSPITALIST

## 2023-05-31 PROCEDURE — 99214 OFFICE O/P EST MOD 30 MIN: CPT | Performed by: STUDENT IN AN ORGANIZED HEALTH CARE EDUCATION/TRAINING PROGRAM

## 2023-05-31 PROCEDURE — 93010 ELECTROCARDIOGRAM REPORT: CPT | Performed by: INTERNAL MEDICINE

## 2023-05-31 PROCEDURE — G0378 HOSPITAL OBSERVATION PER HR: HCPCS

## 2023-05-31 PROCEDURE — 93970 EXTREMITY STUDY: CPT

## 2023-05-31 PROCEDURE — 97161 PT EVAL LOW COMPLEX 20 MIN: CPT

## 2023-05-31 PROCEDURE — 70544 MR ANGIOGRAPHY HEAD W/O DYE: CPT

## 2023-05-31 PROCEDURE — 93005 ELECTROCARDIOGRAM TRACING: CPT | Performed by: NURSE PRACTITIONER

## 2023-05-31 PROCEDURE — 82948 REAGENT STRIP/BLOOD GLUCOSE: CPT

## 2023-05-31 PROCEDURE — 97165 OT EVAL LOW COMPLEX 30 MIN: CPT

## 2023-05-31 PROCEDURE — A9577 INJ MULTIHANCE: HCPCS | Performed by: HOSPITALIST

## 2023-05-31 PROCEDURE — 92523 SPEECH SOUND LANG COMPREHEN: CPT

## 2023-05-31 RX ORDER — AMLODIPINE BESYLATE 5 MG/1
5 TABLET ORAL DAILY
Status: DISCONTINUED | OUTPATIENT
Start: 2023-05-31 | End: 2023-06-01 | Stop reason: HOSPADM

## 2023-05-31 RX ORDER — ASPIRIN 81 MG/1
81 TABLET ORAL DAILY
Status: DISCONTINUED | OUTPATIENT
Start: 2023-05-31 | End: 2023-06-01 | Stop reason: HOSPADM

## 2023-05-31 RX ORDER — LOSARTAN POTASSIUM 50 MG/1
100 TABLET ORAL DAILY
Status: DISCONTINUED | OUTPATIENT
Start: 2023-05-31 | End: 2023-06-01 | Stop reason: HOSPADM

## 2023-05-31 RX ORDER — POTASSIUM CHLORIDE 750 MG/1
10 TABLET, FILM COATED, EXTENDED RELEASE ORAL DAILY
COMMUNITY

## 2023-05-31 RX ADMIN — ASPIRIN 325 MG: 325 TABLET ORAL at 09:20

## 2023-05-31 RX ADMIN — Medication 10 ML: at 20:46

## 2023-05-31 RX ADMIN — CLOPIDOGREL BISULFATE 75 MG: 75 TABLET ORAL at 09:20

## 2023-05-31 RX ADMIN — AMLODIPINE BESYLATE 5 MG: 5 TABLET ORAL at 09:20

## 2023-05-31 RX ADMIN — ASPIRIN 81 MG: 81 TABLET, COATED ORAL at 18:01

## 2023-05-31 RX ADMIN — ATORVASTATIN CALCIUM 80 MG: 40 TABLET, FILM COATED ORAL at 20:44

## 2023-05-31 RX ADMIN — LOSARTAN POTASSIUM 100 MG: 50 TABLET, FILM COATED ORAL at 09:20

## 2023-05-31 RX ADMIN — GADOBENATE DIMEGLUMINE 19 ML: 529 INJECTION, SOLUTION INTRAVENOUS at 12:02

## 2023-05-31 RX ADMIN — Medication 10 ML: at 09:20

## 2023-05-31 NOTE — PLAN OF CARE
Goal Outcome Evaluation:  Plan of Care Reviewed With: patient, son           Outcome Evaluation: PT Evaluation Complete: Patient performs sit to/from stand transfers with SBA and gait x 200 feet with a straight cane and a FWW. With use of straight cane, patient demonstrates anterior propulsion with decreased heel strike, wide base, and short stride length bilaterally. Patient with LOB x 2 with use of straight cane, able to correct with CGA. With use of FWW, patient demonstrates improved upright posture, improved stride length, improved heel strike and no loss of balance. Patient demonstrates improved gait mechanics and safety with use of FWW vs straight cane. Patient agreeable to use. Patient with 5/5 strength in bilateral LE's. Patient would benefit from one additional visit, if he remains in the hospital overnight, to ensure consistency and safety with use of FWW. Recommend home health PT at discharge, patient and son agreeable.

## 2023-05-31 NOTE — PROGRESS NOTES
Stroke Progress Note       Chief Complaint:  weakness    Subjective    Subjective     Subjective:  Sitting in the chair    Review of Systems   Constitutional: No fatigue  HENT: Negative for nosebleeds and rhinorrhea.    Eyes: Negative for redness.   Respiratory: Negative for cough.    Gastrointestinal: Negative for anal bleeding.   Endocrine: Negative for polydipsia.   Genitourinary: Negative for enuresis and urgency.   Musculoskeletal: Negative for joint swelling.   Neurological: Negative for tremors.   Psychiatric/Behavioral: Negative for hallucinations.     Objective      Temp:  [97.9 °F (36.6 °C)-98.5 °F (36.9 °C)] 97.9 °F (36.6 °C)  Heart Rate:  [56-69] 56  Resp:  [16-20] 16  BP: (156-190)/(78-95) 156/89          NEURO( Exam is performed with help of hospital staff at bed side and observed by me via audio-video interface)    MENTAL STATUS: AAOx3, memory intact, fund of knowledge appropriate    LANG/SPEECH: Naming and repetition intact, fluent, follows 3-step commands    CRANIAL NERVES:    Pupils equal and reactive, EOMI intact, no gaze deviation, no nystagmus  No facial droop, cough and gag intact, shoulder shrug intact, tongue midline     MOTOR:  Moves all extremities equally    SENSORY: Normal to light touch all throughout     COORDINATION: Normal finger to nose and heel to shin, no tremor, no dysmetria    STATION: Not assessed due to patient condition    GAIT: Not assessed due to patient condition  Results Review:    I reviewed the patient's new clinical results.    Lab Results (last 24 hours)     Procedure Component Value Units Date/Time    POC Glucose Once [[id number]]  (Normal) Collected: 05/31/23 0744    Specimen: Blood Updated: 05/31/23 0750     Glucose 90 mg/dL      Comment: Meter: [id number] : [id number] Saul CROWDER       POC Glucose Once [[id number]]  (Normal) Collected: 05/31/23 0040    Specimen: Blood Updated: 05/31/23 0046     Glucose 88 mg/dL      Comment: Meter: [id number] : [id number]  Ashlyn Bullard RAMÓN       TSH [[id number]]  (Normal) Collected: 05/30/23 1036    Specimen: Blood Updated: 05/30/23 2018     TSH 1.610 uIU/mL     Hemoglobin A1c [[id number]]  (Abnormal) Collected: 05/30/23 1036    Specimen: Blood Updated: 05/30/23 1813     Hemoglobin A1C 6.40 %     Narrative:      Hemoglobin A1C Ranges:    Increased Risk for Diabetes  5.7% to 6.4%  Diabetes                     >= 6.5%  Diabetic Goal                < 7.0%    Lipid Panel [[id number]] Collected: 05/30/23 1036    Specimen: Blood Updated: 05/30/23 1809     Total Cholesterol 142 mg/dL      Triglycerides 48 mg/dL      HDL Cholesterol 42 mg/dL      LDL Cholesterol  89 mg/dL      VLDL Cholesterol 11 mg/dL      LDL/HDL Ratio 2.15    Narrative:      Cholesterol Reference Ranges  (U.S. Department of Health and Human Services ATP III Classifications)    Desirable          <200 mg/dL  Borderline High    200-239 mg/dL  High Risk          >240 mg/dL      Triglyceride Reference Ranges  (U.S. Department of Health and Human Services ATP III Classifications)    Normal           <150 mg/dL  Borderline High  150-199 mg/dL  High             200-499 mg/dL  Very High        >500 mg/dL    HDL Reference Ranges  (U.S. Department of Health and Human Services ATP III Classifications)    Low     <40 mg/dl (major risk factor for CHD)  High    >60 mg/dl ('negative' risk factor for CHD)        LDL Reference Ranges  (U.S. Department of Health and Human Services ATP III Classifications)    Optimal          <100 mg/dL  Near Optimal     100-129 mg/dL  Borderline High  130-159 mg/dL  High             160-189 mg/dL  Very High        >189 mg/dL        CT Head Without Contrast    Result Date: 5/30/2023  1.  No acute intracranial abnormality. No evidence of acute intracranial hemorrhage. 2.  Advanced atrophy, greater over the frontotemporal lobes where there are small chronic low attenuation subdural hygromas that are stable. 3.  Old left occipital infarct. Old bilateral basal  ganglia lacunar infarcts. Extensive chronic white matter changes. 4.  No significant change since 05/04/2021.  This report was finalized on 5/30/2023 11:10 AM by Dr. Cory Schaeffer MD.      MRI Brain Without Contrast    Result Date: 5/30/2023  1.  Tiny, likely subacute white matter infarct in the deep left parietal white matter measuring about 6 mm, not hemorrhagic. 2.  No other evidence of acute or subacute ischemia or additional restricted diffusion. 3.  Stable old left occipital infarct. Old bilateral basal ganglia lacunar infarcts. 4.  Stable diffuse chronic changes as noted above.  This report was finalized on 5/30/2023 2:45 PM by Dr. Cory Schaeffer MD.      Results for orders placed during the hospital encounter of 05/30/23    Adult Transthoracic Echo Complete W/ Cont if Necessary Per Protocol (With Agitated Saline)    Interpretation Summary  •  Left ventricular systolic function is normal. Left ventricular ejection fraction appears to be 61 - 65%.  •  Left ventricular wall thickness is consistent with mild concentric hypertrophy.  •  Left ventricular diastolic function is consistent with (grade I) impaired relaxation.  •  The left atrial cavity is mild to moderately dilated.  •  The agitated saline study is positive, consistent with a small to moderate size PFO  •  Mild aortic valve regurgitation is present.  •  There is no evidence of pericardial effusion            Assessment/Plan     Assessment/Plan:  Acute Stroke Evaluation: Acute ishemic stroke in the left cerebral hemisphere, likely etiology artery-artery embolic vs cardioembolism  MRA head and Neck- showed no flow limiting stenosis   TTE showed mild- mod LAE and PFO    Given extensive vascular history- ROPE Score of 2- PFO could be non contributory. We will get Lower extremity doppler to rule out DVT. It is negative okay Continue DAPT indefinitely until neurology follow up clinic in 4-6 weeks .  Event monitor to detect for occult afib  Normal  blood pressure goals   High dose statin at discharge     Okay for discharge if above the work up( doppler of LE, Physical therapy clearance) is done.              Rene Lopez MD  05/31/23  12:51 EDT

## 2023-05-31 NOTE — PLAN OF CARE
Goal Outcome Evaluation:  Plan of Care Reviewed With: patient, son           Outcome Evaluation: SLP: Completed communication eval and SLUMS. Pt with mild to moderate dysarthria of speech that waxes and wanes during the session. No aphasia or apraxia noted. Mild word retrieval felt to be r/t cognitive deficits and not due to aphasia. Fatigue is a contributing factor. Pt with Mild Neurocognitive Disorder based on SLUMS score of 21. No significant change as noted from [facility] from July 2021 with a score of 20. Impressions: Mild to moderate dysarthria and Mild Neurocognitive Disorder. Recommend: home health SLP for both dysarthria and cognition. SLP to continue to follow during acute care stay.

## 2023-05-31 NOTE — CASE MANAGEMENT/SOCIAL WORK
Continued Stay Note  MANI Hernandez     Patient Name: Cory Hardwick  MRN: [id number]  Today's Date: 5/31/2023    Admit Date: 5/30/2023    Plan: plan home, son assists   Discharge Plan     Row Name 05/31/23 1320       Plan    Plan plan home, son assists    Roadmap to Recovery Yes    Patient/Family in Agreement with Plan yes    Plan Comments Spoke with patients son,Cory Hardwick Jr at bedside. Patient is off unit for MRI. Face sheet verified. Patient lives in a home with 2 roommates. Son states one is the age and 'ability' of the patient and the othr roommate is his 50-quirino year old son. He states neither of them are able to assist the patieLong discussionnt if needed after dc. He states the patient has a cane and does not drive. Family assists with transportation. Long discussion regarding plan of care. Roadmap to Recovery provided and discussed. The following resources provided/explained - Road to Recovery magazine, [employer] brochure, listing of HH agencies, listing of area rehab/LTC facilities, How to apply for Medicaid info, KIPDA info, private pay care giver listing & area food bank information. ACP referral entered and oMuna Fierro/ notified to assist with living will. RW per [facility] delivered to bedside and paperwork completed. Patient sees Payton SCANLON as PCP and uses [facility]. Cory is agreeable to HH following and states patient needs assist with med management as well as PT.Spoke with [facility]- referral given.Informed patient may dc home today. Patient accepted. Dr Tom updated.  will continue to follow.               Discharge Codes    No documentation.                     Huber Moss RN

## 2023-05-31 NOTE — THERAPY EVALUATION
Patient Name: Cory Hardwick  : 1950    MRN: [id number]                              Today's Date: 2023       Admit Date: 2023    Visit Dx:     ICD-10-CM ICD-9-CM   1. Cerebrovascular accident (CVA), unspecified mechanism  I63.9 434.91     Patient Active Problem List   Diagnosis   • Cerebrovascular accident (CVA), unspecified mechanism     Past Medical History:   Diagnosis Date   • Balance disorder    • Diabetes mellitus    • Dizziness    • Fall    • GERD (gastroesophageal reflux disease)    • Hypertension    • Low vitamin D level    • Memory loss    • Peripheral neuropathy      Past Surgical History:   Procedure Laterality Date   • JOINT REPLACEMENT      right hip    • JOINT REPLACEMENT      knee       General Information     Row Name 23 1155          Physical Therapy Time and Intention    Document Type evaluation  -BP     Mode of Treatment physical therapy  -BP     Row Name 23 1155          General Information    Patient Profile Reviewed yes  Presents due to slurred speech, gait instability, and facial droop. Patient found to have a small subacute L parietal infarct with noted chronic basal ganglia infarcts. Patient with chronic balance deficits.  -BP     Prior Level of Function independent:;gait;transfer;bed mobility;ADL's;all household mobility  with use of straight cane. Patient reports he has experienced falls on uneven ground and tends to avoid all uneven surfaces.  -BP     Existing Precautions/Restrictions fall  -BP     Barriers to Rehab previous functional deficit  -BP     Row Name 23 1155          Living Environment    People in Home other (see comments)  Patient has a room mate however is not able to assist with care  -BP     Row Name 23 1155          Home Main Entrance    Number of Stairs, Main Entrance six  -BP     Stair Railings, Main Entrance --  one handrail, does not specify which side  -BP     Row Name 23 1155          Stairs Within Home, Primary     Number of Stairs, Within Home, Primary none  -BP     Row Name 05/31/23 1155          Cognition    Orientation Status (Cognition) oriented x 3  -BP     Row Name 05/31/23 1155          Safety Issues, Functional Mobility    Impairments Affecting Function (Mobility) balance  -BP     Comment, Safety Issues/Impairments (Mobility) patient with improved gait stability and safety with use of FWW vs straight cane.  -BP           User Key  (r) = Recorded By, (t) = Taken By, (c) = Cosigned By    Initials Name Provider Type    BP Charly Mar, PT Physical Therapist               Mobility     Row Name 05/31/23 1158          Bed Mobility    Comment, (Bed Mobility) deferred, patient up in chair  -BP     Row Name 05/31/23 1158          Transfers    Comment, (Transfers) verbal cues for hand placement  -     Row Name 05/31/23 1158          Sit-Stand Transfer    Sit-Stand [place] (Transfers) standby assist  -     Assistive Device (Sit-Stand Transfers) walker, front-wheeled  -     Row Name 05/31/23 1158          Gait/Stairs (Locomotion)    [place] Level (Gait) contact guard;standby assist;verbal cues  -     Assistive Device (Gait) walker, front-wheeled;cane, straight  -BP     Distance in Feet (Gait) 200  -BP     Deviations/Abnormal Patterns (Gait) gait speed decreased;base of support, narrow;stride length decreased;base of support, wide  -BP     Bilateral Gait Deviations heel strike decreased  -BP     Comment, (Gait/Stairs) Performed gait with straight cane and with FWW. With use of cane, patient with increased anterior propulsion with decreased step length and decreased heel strike. With cane, patient with two episodes of balance loss, one with directional change and one with forward gait. Patient able to correct without assist. WIth use of FWW, patient demonstrates improved step length, improved heel strike bilaterally, no loss of balance noted, and improved upright posture.  -BP           User Key  (r) =  Recorded By, (t) = Taken By, (c) = Cosigned By    Initials Name Provider Type    Charly Van PT Physical Therapist               Obj/Interventions     Row Name 05/31/23 1200          Range of Motion Comprehensive    Comment, General Range of Motion B LE AROM WFL.  -BP     Row Name 05/31/23 1200          Strength Comprehensive (MMT)    Comment, General Manual Muscle Testing (MMT) Assessment B LE gross MMT 5/5.  -BP     Row Name 05/31/23 1200          Balance    Comment, Balance sitting balance- independent, static standing balance-SBA with FWW  -BP     Row Name 05/31/23 1200          Sensory Assessment (Somatosensory)    Sensory Assessment (Somatosensory) sensation intact  -BP           User Key  (r) = Recorded By, (t) = Taken By, (c) = Cosigned By    Initials Name Provider Type    Charly Van PT Physical Therapist               Goals/Plan     Row Name 05/31/23 1238          Transfer Goal 1 (PT)    Activity/Assistive Device (Transfer Goal 1, PT) sit-to-stand/stand-to-sit;walker, rolling  -BP     [place] Level/Cues Needed (Transfer Goal 1, PT) supervision required  -BP     Time Frame (Transfer Goal 1, PT) 2 days  -BP     Progress/Outcome (Transfer Goal 1, PT) new goal  -BP     Row Name 05/31/23 1238          Gait Training Goal 1 (PT)    Activity/Assistive Device (Gait Training Goal 1, PT) gait (walking locomotion);walker, rolling  -BP     [place] Level (Gait Training Goal 1, PT) supervision required  -BP     Distance (Gait Training Goal 1, PT) 100  -BP     Time Frame (Gait Training Goal 1, PT) 2 days  -BP     Progress/Outcome (Gait Training Goal 1, PT) new goal  -BP           User Key  (r) = Recorded By, (t) = Taken By, (c) = Cosigned By    Initials Name Provider Type    Charly Van PT Physical Therapist               Clinical Impression     Row Name 05/31/23 1201          Pain    Pretreatment Pain Rating 0/10 - no pain  -BP     Posttreatment Pain Rating 0/10 - no pain  -BP      Additional Documentation Pain Scale: Numbers Pre/Post-Treatment (Group)  -BP     Row Name 05/31/23 1201          Plan of Care Review    Plan of Care Reviewed With patient;son  -BP     Outcome Evaluation PT Evaluation Complete: Patient performs sit to/from stand transfers with SBA and gait x 200 feet with a straight cane and a FWW. With use of straight cane, patient demonstrates anterior propulsion with decreased heel strike, wide base, and short stride length bilaterally. Patient with LOB x 2 with use of straight cane, able to correct with CGA. With use of FWW, patient demonstrates improved upright posture, improved stride length, improved heel strike and no loss of balance. Patient demonstrates improved gait mechanics and safety with use of FWW vs straight cane. Patient agreeable to use. Patient with 5/5 strength in bilateral LE's. Patient would benefit from one additional visit, if he remains in the hospital overnight, to ensure consistency and safety with use of FWW. Recommend home health PT at discharge, patient and son agreeable.  -BP     Row Name 05/31/23 1201          Therapy Assessment/Plan (PT)    Rehab Potential (PT) good, to achieve stated therapy goals  -BP     Criteria for Skilled Interventions Met (PT) yes;meets criteria  -BP     Therapy Frequency (PT) other (see comments)  one visit  -BP     Predicted Duration of Therapy Intervention (PT) 2 days  -BP     Row Name 05/31/23 1201          Positioning and Restraints    Pre-Treatment Position sitting in chair/recliner  -BP     Post Treatment Position chair  -BP     In Chair notified nsg;reclined;call light within reach;encouraged to call for assist;with family/caregiver  -BP           User Key  (r) = Recorded By, (t) = Taken By, (c) = Cosigned By    Initials Name Provider Type    BP Charly Mar, PT Physical Therapist               Outcome Measures     Row Name 05/31/23 1236          How much help from another person do you currently need...     Turning from your back to your side while in flat bed without using bedrails? 3  -BP     Moving from lying on back to sitting on the side of a flat bed without bedrails? 3  -BP     Moving to and from a bed to a chair (including a wheelchair)? 3  -BP     Standing up from a chair using your arms (e.g., wheelchair, bedside chair)? 3  -BP     Climbing 3-5 steps with a railing? 3  -BP     To walk in hospital room? 3  -BP     AM-PAC 6 Clicks Score (PT) 18  -BP     Highest level of mobility 6 --> Walked 10 steps or more  -BP     Row Name 05/31/23 1238 05/31/23 1110       Modified Mendez Scale    Pre-Stroke Modified [place] Scale 4 - Moderately severe disability.  Unable to walk without assistance, and unable to attend to own bodily needs without assistance.  -BP 4 - Moderately severe disability.  Unable to walk without assistance, and unable to attend to own bodily needs without assistance.  -EN    Modified Mendez Scale 4 - Moderately severe disability.  Unable to walk without assistance, and unable to attend to own bodily needs without assistance.  -BP 4 - Moderately severe disability.  Unable to walk without assistance, and unable to attend to own bodily needs without assistance.  assist with use of FWW while in the hospital  -EN    Row Name 05/31/23 1238 05/31/23 1110       Functional Assessment    Outcome Measure Options AM-PAC 6 Clicks Basic Mobility (PT)  - AM-PAC 6 Clicks Daily Activity (OT);Modified [place]  -EN          User Key  (r) = Recorded By, (t) = Taken By, (c) = Cosigned By    Initials Name Provider Type    Gabbi Barbosa OTR Occupational Therapist    Charly Van, [place] Physical Therapist                             Physical Therapy Education     Title: PT OT SLP Therapies (In Progress)     Topic: Physical Therapy (In Progress)     Point: Mobility training (Done)     Learning Progress Summary           Patient Acceptance, E,TB, VU by  at 5/31/2023 1240                   Point: Home  exercise program (Not Started)     Learner Progress:  Not documented in this visit.          Point: Body mechanics (Done)     Learning Progress Summary           Patient Acceptance, E,TB, VU by  at 5/31/2023 1240                               User Key     Initials Effective Dates Name Provider Type Discipline     06/16/21 -  Charly Mar PT Physical Therapist PT              PT Recommendation and Plan     Plan of Care Reviewed With: patient, son  Outcome Evaluation: PT Evaluation Complete: Patient performs sit to/from stand transfers with SBA and gait x 200 feet with a straight cane and a FWW. With use of straight cane, patient demonstrates anterior propulsion with decreased heel strike, wide base, and short stride length bilaterally. Patient with LOB x 2 with use of straight cane, able to correct with CGA. With use of FWW, patient demonstrates improved upright posture, improved stride length, improved heel strike and no loss of balance. Patient demonstrates improved gait mechanics and safety with use of FWW vs straight cane. Patient agreeable to use. Patient with 5/5 strength in bilateral LE's. Patient would benefit from one additional visit, if he remains in the hospital overnight, to ensure consistency and safety with use of FWW. Recommend home health PT at discharge, patient and son agreeable.     Time Calculation:    PT Charges     Row Name 05/31/23 1240             Time Calculation    Start Time 1010  -BP      Stop Time 1041  -BP      Time Calculation (min) 31 min  -BP      PT Received On 05/31/23  -BP      PT - Next Appointment 06/01/23  -BP            User Key  (r) = Recorded By, (t) = Taken By, (c) = Cosigned By    Initials Name Provider Type     Charly Mar PT Physical Therapist              Therapy Charges for Today     Code Description Service Date Service Provider Modifiers Qty    [id number]  PT EVAL LOW COMPLEXITY 2 5/31/2023 Charly Mar, PT GP 1          PT G-Codes  Outcome  Measure Options: AM-PAC 6 Clicks Basic Mobility (PT)  AM-PAC 6 Clicks Score (PT): 18  AM-PAC 6 Clicks Score (OT): 21  Modified Mendez Scale: 4 - Moderately severe disability.  Unable to walk without assistance, and unable to attend to own bodily needs without assistance.  PT Discharge Summary  Anticipated Discharge Disposition (PT): home with home health    Charly Mar, PT  5/31/2023

## 2023-05-31 NOTE — PLAN OF CARE
Goal Outcome Evaluation:  Plan of Care Reviewed With: patient        Progress: no change  Outcome Evaluation: pt oriented to room, call light w/in reach - bed alarm in use - amulates to restroom w/assistance - NIH=0 - garbled speech at times d/t dentures not utilized - rested well through night

## 2023-05-31 NOTE — PLAN OF CARE
Goal Outcome Evaluation:  Plan of Care Reviewed With: patient, son           Outcome Evaluation: OT evaluation completed. Patient alert and oriented X 3. Patient's B UE ROM was WNL and strength was 5/5. Patient performed sit to stand with SBA. Patient initially performed functional mobility with straight cane and required CGA with one LOB with turning. Patient transitioned to a FWW and performed mobility with SBA with no LOB. Recommend HH P.T. at discharge and will need a FWW. No further skilled OT needs at this time.

## 2023-05-31 NOTE — THERAPY DISCHARGE NOTE
Acute Care - Occupational Therapy Discharge   [place]    Patient Name: Cory Hradwick  : 1950    MRN: [id number]                              Today's Date: 2023       Admit Date: 2023    Visit Dx:     ICD-10-CM ICD-9-CM   1. Cerebrovascular accident (CVA), unspecified mechanism  I63.9 434.91     Patient Active Problem List   Diagnosis   • Cerebrovascular accident (CVA), unspecified mechanism     Past Medical History:   Diagnosis Date   • Balance disorder    • Diabetes mellitus    • Dizziness    • Fall    • GERD (gastroesophageal reflux disease)    • Hypertension    • Low vitamin D level    • Memory loss    • Peripheral neuropathy      Past Surgical History:   Procedure Laterality Date   • JOINT REPLACEMENT      right hip    • JOINT REPLACEMENT      knee       General Information     Row Name 23 1058          OT Time and Intention    Document Type discharge evaluation/summary  -EN     Mode of Treatment occupational therapy  -EN     Row Name 23 1053          General Information    Patient Profile Reviewed yes  Patient presented to ED with few day history of slurred speech and facial droop. Found to have a tiny likely sub acute white matter infarct in the deep L parietal white matter.  -EN     Prior Level of Function independent:;ADL's;all household mobility  Pt uses a cane, has history of several falls the past year. Lives with a roomate in a one level home with a ramp.Was ind with ADLs and mobility.  -EN     Existing Precautions/Restrictions fall  -EN     Barriers to Rehab none identified  -EN     Row Name 23 1053          Living Environment    People in Home other (see comments)  Lives with roomate who is unable to physically assist.  -EN     Row Name 23 1055          Home Main Entrance    Number of Stairs, Main Entrance other (see comments)  ramp into home  -EN     Row Name 23 1055          Cognition    Orientation Status (Cognition) oriented x 3  -EN     Row Name  05/31/23 1055          Safety Issues, Functional Mobility    Impairments Affecting Function (Mobility) other (see comments)  while using cane took small steps and tended to walk on toes, with walker, improved length of steps and did not walk on toes  -EN           User Key  (r) = Recorded By, (t) = Taken By, (c) = Cosigned By    Initials Name Provider Type    Gabbi Barbosa OTR Occupational Therapist               Mobility/ADL's     Row Name 05/31/23 1100          Bed Mobility    Comment, (Bed Mobility) deferred, up in chair  -EN     Row Name 05/31/23 1100          Transfers    Transfers sit-stand transfer;stand-sit transfer  -EN     Row Name 05/31/23 1100          Sit-Stand Transfer    Sit-Stand [place] (Transfers) standby assist  CGA with straight cane, SBA with FWW  -EN     Assistive Device (Sit-Stand Transfers) walker, front-wheeled  -EN     Comment, (Sit-Stand Transfer) Improved mobility with use of FWW vs straight cane  -EN     Row Name 05/31/23 1100          Stand-Sit Transfer    Stand-Sit [place] (Transfers) standby assist  -EN     Assistive Device (Stand-Sit Transfers) walker, front-wheeled  -EN     Row Name 05/31/23 1100          Functional Mobility    Functional Mobility- Ind. Level --  CGA with s. cane, SBA with FWW  -EN     Functional Mobility-Distance (Feet) --  >100  -EN     Functional Mobility- Safety Issues balance decreased during turns;other (see comments);step length decreased  one slight loss of balance with turn using s. cane, no LOB with turns with FWW  -EN     Row Name 05/31/23 1100          Activities of Daily Living    BADL Assessment/Intervention lower body dressing  -EN     Row Name 05/31/23 1100          Lower Body Dressing Assessment/Training    [place] Level (Lower Body Dressing) doff;don;socks;independent  -EN     Position (Lower Body Dressing) supported sitting  seated in chair  -EN           User Key  (r) = Recorded By, (t) = Taken By, (c) = Cosigned By     Initials Name Provider Type    Gabbi Barbosa OTR Occupational Therapist               Obj/Interventions     Row Name 05/31/23 1102          Sensory Assessment (Somatosensory)    Sensory Assessment reports no numbness or tingling in UEs  -EN     [facility] Name 05/31/23 1102          Range of Motion Comprehensive    General Range of Motion bilateral upper extremity ROM WFL  -EN     Row Name 05/31/23 1102          Strength Comprehensive (MMT)    Comment, General Manual Muscle Testing (MMT) Assessment B UE strength 5/5  -EN     Row Name 05/31/23 1102          Balance    Comment, Balance ind with dynamic sitting, SBA with static standing with FWW  -EN           User Key  (r) = Recorded By, (t) = Taken By, (c) = Cosigned By    Initials Name Provider Type    EN Gabbi Crowe OTR Occupational Therapist               Goals/Plan    No documentation.                Clinical Impression     Row Name 05/31/23 1103          Pain Assessment    Pretreatment Pain Rating 0/10 - no pain  -EN     Posttreatment Pain Rating 0/10 - no pain  -EN     Row Name 05/31/23 1103          Plan of Care Review    Plan of Care Reviewed With patient;son  -EN     Outcome Evaluation OT evaluation completed. Patient alert and oriented X 3. Patient's B UE ROM was WNL and strength was 5/5. Patient performed sit to stand with SBA. Patient initially performed functional mobility with straight cane and required CGA with one LOB with turning. Patient transitioned to a FWW and performed mobility with SBA with no LOB. Recommend HH P.T. at discharge and will need a FWW. No further skilled OT needs at this time.  -EN     [facility] Name 05/31/23 1103          Therapy Assessment/Plan (OT)    Criteria for Skilled Therapeutic Interventions Met (OT) no problems identified which require skilled intervention  -EN     Therapy Frequency (OT) evaluation only  -EN     [facility] Name 05/31/23 1103          Therapy Plan Review/Discharge Plan (OT)    Equipment Needs Upon  Discharge (OT) walker, rolling  -EN     Anticipated Discharge Disposition (OT) home with home health  -EN     Row Name 05/31/23 1103          Positioning and Restraints    Pre-Treatment Position sitting in chair/recliner  -EN     Post Treatment Position chair  -EN     In Chair reclined;call light within reach;encouraged to call for assist;with family/caregiver  -EN           User Key  (r) = Recorded By, (t) = Taken By, (c) = Cosigned By    Initials Name Provider Type    Gabbi Barbosa OTR Occupational Therapist               Outcome Measures     Row Name 05/31/23 1110          How much help from another is currently needed...    Putting on and taking off regular lower body clothing? 3  -EN     Bathing (including washing, rinsing, and drying) 3  -EN     Toileting (which includes using toilet bed pan or urinal) 3  -EN     Putting on and taking off regular upper body clothing 4  -EN     Taking care of personal grooming (such as brushing teeth) 4  -EN     Eating meals 4  -EN     AM-PAC 6 Clicks Score (OT) 21  -EN     Row Name 05/31/23 1110          Modified Mendez Scale    Pre-Stroke Modified [place] Scale 4 - Moderately severe disability.  Unable to walk without assistance, and unable to attend to own bodily needs without assistance.  -EN     Modified [place] Scale 4 - Moderately severe disability.  Unable to walk without assistance, and unable to attend to own bodily needs without assistance.  assist with use of FWW while in the hospital  -EN     Row Name 05/31/23 1110          Functional Assessment    Outcome Measure Options AM-PAC 6 Clicks Daily Activity (OT);Modified [place]  -EN           User Key  (r) = Recorded By, (t) = Taken By, (c) = Cosigned By    Initials Name Provider Type    Gabbi Barbosa OTR Occupational Therapist              Occupational Therapy Education     Title: PT OT SLP Therapies (Done)     Topic: Occupational Therapy (Done)     Point: ADL training (Done)     Description:    Instruct learner(s) on proper safety adaptation and remediation techniques during self care or transfers.   Instruct in proper use of assistive devices.              Learning Progress Summary           Patient Acceptance, E, VU by EN at 5/31/2023 1112    Comment: Patient educated on recommendation of FWW and home safety.                               User Key     Initials Effective Dates Name Provider Type Discipline    EN 06/16/21 -  Gabbi Crowe OTR Occupational Therapist OT              OT Recommendation and Plan  Therapy Frequency (OT): evaluation only  Plan of Care Review  Plan of Care Reviewed With: patient, son  Outcome Evaluation: OT evaluation completed. Patient alert and oriented X 3. Patient's B UE ROM was WNL and strength was 5/5. Patient performed sit to stand with SBA. Patient initially performed functional mobility with straight cane and required CGA with one LOB with turning. Patient transitioned to a FWW and performed mobility with SBA with no LOB. Recommend HH P.T. at discharge and will need a FWW. No further skilled OT needs at this time.       Time Calculation:    Time Calculation- OT     Row Name 05/31/23 1113             Time Calculation- OT    OT Start Time 1010  -EN      OT Stop Time 1040  -EN      OT Time Calculation (min) 30 min  -EN            User Key  (r) = Recorded By, (t) = Taken By, (c) = Cosigned By    Initials Name Provider Type    EN Gabbi Crowe OTR Occupational Therapist              Therapy Charges for Today     Code Description Service Date Service Provider Modifiers Qty    30555320200 HC OT EVAL LOW COMPLEXITY 2 5/31/2023 Gabbi Crowe OTR GO 1             OT Discharge Summary  Anticipated Discharge Disposition (OT): home with home health    JULIO Shah  5/31/2023

## 2023-05-31 NOTE — THERAPY EVALUATION
Acute Care - Speech Language Pathology Initial Evaluation   Angella Marino     Patient Name: Cory Hardwick  : 1950  MRN: [id number]  Today's Date: 2023               Admit Date: 2023     Visit Dx:    ICD-10-CM ICD-9-CM   1. Cerebrovascular accident (CVA), unspecified mechanism  I63.9 434.91   2. Dysarthria due to acute stroke  I63.9 434.91    R47.1 784.51   3. Cognitive communication deficit  R41.841 799.52     Patient Active Problem List   Diagnosis   • Cerebrovascular accident (CVA), unspecified mechanism     Past Medical History:   Diagnosis Date   • Balance disorder    • Diabetes mellitus    • Dizziness    • Fall    • GERD (gastroesophageal reflux disease)    • Hypertension    • Low vitamin D level    • Memory loss    • Peripheral neuropathy      Past Surgical History:   Procedure Laterality Date   • JOINT REPLACEMENT      right hip    • JOINT REPLACEMENT      knee        SLP Recommendation and Plan  SLP Diagnosis: mild-moderate, dysarthria, moderate, cognitive-linguistic disorder (23 153)  SLP Diagnosis Comments: New, acute deficits of dysarthria of speech r/t recent infarct. Remote diagnosis of Mild Neurocognitive Disorder that persists. (23 153)        [facility] Criteria for Skilled Therapy Interventions Met: yes (23)  Anticipated Discharge Disposition (SLP): home with home health, anticipate therapy at next level of care (23 153)        Predicted Duration Therapy Intervention (Days): 1 week (23 153)  [facility] Diagnostic Follow-Up Needed: reading comprehension (23)        Patient/Family Concerns, Anticipated Discharge Disposition (SLP): Pt and son agreeable to home health therapy. (23 153)  Communication Strategy Suggestions: eliminate distractions when speaking with patient (23 1530)           Plan of Care Reviewed With: patient, son (23 1700)  Outcome Evaluation: SLP: Completed communication eval and SLUMS. Pt with mild to moderate  dysarthria of speech that waxes and wanes during the session. No aphasia or apraxia noted. Mild word retrieval felt to be r/t cognitive deficits and not due to aphasia. Fatigue is a contributing factor. Pt with Mild Neurocognitive Disorder based on SLUMS score of 21. No significant change as noted from [facility] notes from July 2021 with a score of 20. Impressions: Mild to moderate dysarthria and Mild Neurocognitive Disorder. Recommend: home health SLP for both dysarthria and cognition. SLP to continue to follow during acute care stay. (05/31/23 1700)      SLP EVALUATION (last 72 hours)     SLP SLC Evaluation     Row Name 05/31/23 1530 05/31/23 1000                Communication Assessment/Intervention    Document Type evaluation  -AD --       Subjective Information no complaints  -AD --       Patient Observations alert;cooperative  -AD --       Patient/Family/Caregiver Comments/Observations Pt seen at bedside with son present with pt's permission.  -AD --       Session Not Performed -- patient unavailable for evaluation  -AD       Evaluation Not Performed, Comment -- Pt currently with PT at this time. Will check back later.  -AD       Patient Effort good  -AD --       Symptoms Noted During/After Treatment fatigue  -AD --          General Information    Patient Profile Reviewed yes  -AD --       Pertinent History Of Current Problem Pt is a 73 y/o male admitted with slurred speech and gait abnormality. He reports slurred speech on Saturday (5/27/23) and waxing and waning symptoms through 5/30/23. He was admitted and found to have a small infarct in the deep left white matter, subacute in nature. Prior evidence of bilateral lacunar basal ganglia infarcts, old left occiptal infarct. Son and pt report changes in speech pattern and waxing/waning speech intelligibility. Pt also w/hx of cognitive decline with slight increase in confusion since recent infarct. Pt reports he lives w/2 roommates (a father and son) in  a rented room in their house. He reports he has been evaluated in the past and has some memory issues. He no longer drives as of last year (dec 2022). He reports he is able to simple cooking and cleaning as well as manage his finances and medications.  -AD --       Precautions/Limitations, Vision WFL with corrective lenses  -AD --       Precautions/Limitations, Hearing hearing impairment, bilaterally;other (see comments)  Has hearing aids but does not typically wear. Reports 80% loss in left ear and 20% loss in right ear.  -AD --       Patient Level of Education High School graduate with one semester of [employer].  -AD --       Prior Level of Function-Communication cognitive-linguistic impairment  Mild cognitive impairment at least back to July 2021.  -AD --       Plans/Goals Discussed with patient and family;agreed upon  -AD --       Barriers to Rehab previous functional deficit  -AD --       Patient's Goals for Discharge return to home  -AD --       Family Goals for Discharge functional communication;functional cognition  -AD --       Standardized Assessment Used SLUMS  -AD --          Pain    Additional Documentation --  no current pain reported  -AD --          Comprehension Assessment/Intervention    Comprehension Assessment/Intervention Auditory Comprehension  -AD --          Auditory Comprehension Assessment/Intervention    Auditory Comprehension (Communication) WFL  -AD --       Able to Identify Objects/Pictures (Communication) body part;familiar objects;WFL  -AD --       Answers Questions (Communication) yes/no;wh questions;personal;simple;concrete;WFL  -AD --       Able to Follow Commands (Communication) 1-step;2-step;WFL;3-step;mild impairment  -AD --       Narrative Discourse simple paragraph level;mild impairment;moderate impairment  -AD --       Successful Auditory Strategies (Communication) repetition;decrease environmental distractions  -AD --          Expression Assessment/Intervention     Expression Assessment/Intervention verbal expression  -AD --          Verbal Expression Assessment/Intervention    Verbal Expression WFL  -AD --       Automatic Speech (Communication) response to greeting;counting 1-20;WFL  -AD --       Repetition words;phrases;sentences;WFL  -AD --       Phrase Completion automatic/predictable;WFL  -AD --       Confrontational Naming high frequency;WFL;low frequency;mild impairment;other (see comments)  extended time for recall of low frequency targets  -AD --       Spontaneous/Functional Words simple;WFL;complex;mild impairment  -AD --       Conversational Discourse/Fluency WFL;mild impairment;delayed responses  -AD --          Oral Motor Structure and Function    Oral Motor Structure and Function WFL  -AD --       Oral Lesions or Structural Abnormalities and/or variants none  -AD --       Dentition Assessment missing teeth;other (see comments)  has most lower incisors; no upper teeth and no lower molars; has dentures but does not wear  -AD --       Mucosal Quality moist, healthy  -AD --          Oral Musculature and Cranial Nerve Assessment    Oral Motor General Assessment WFL  -AD --       Oral Motor, Comment No deficits of the lip, tongue, palate, jaw or larynx. Functional strength and ROM  -AD --          Motor Speech Assessment/Intervention    Motor Speech Function mild impairment;moderate impairment  -AD --       Characteristics Consistent with Dysarthria increased rate;decreased intensity;slurred speech;other (see comments)  c/o of hypernasality but not significant per SLP observation  -AD --       Initiation of Phonation (Communication) voluntary;involuntary - (e.g. sneezing, laughing, yawning);WFL  -AD --       Automatic Speech (Communication) response to greeting;WFL;counting 1-20;mild impairment  -AD --       Verbal Repetition (Communication) monosyllabic words;polysyllabic words;phrases;sentences;WFL  -AD --       Phase Completion automatic/predictable;WFL  -AD --        Conversational Speech (Communication) simple;WFL;moderate complexity;mild impairment  -AD --       Speech intelligibility 90%;in quiet environment;in connected speech;with unfamiliar listener  fatigue towards the end of session affecting performance w/decline in intelligibility to 70-80%  -AD --          Standardized Tests    Cognitive/Memory Tests SLUMS: [facility] Mental Status Examination  -AD --          SLUMS: [facility] Status Examination    SLUMS Score 21  -AD --       SLUMS Range 21-26: Mild Neurocognitive Disorder (High school education or higher)  -AD --       SLUMS Comments Pt with mild neurocognitive deficits based on score of 21. Deficits in delayed recall, mental manipulation skills, Story Recall and divergent naming. Scores as follows: Attention and Orientation 3/3; Immediate recall of all 5 items after one trial w/no score for this task; Calculation and Registration 3/3; Category Naming with time constraint 1/3; Delayed Recall w/Interference 3/5; Registration and Digit Span 1/2; Clock Draw 4/4 (w/one self corection on small hand); Visual Spatial  2/2; Story Recall with Executive Functions 4/8.  -AD --          SLP Evaluation Clinical Impressions    SLP Diagnosis mild-moderate;dysarthria;moderate;cognitive-linguistic disorder  -AD --       SLP Diagnosis Comments New, acute deficits of dysarthria of speech r/t recent infarct. Remote diagnosis of Mild Neurocognitive Disorder that persists.  -AD --       Rehab Potential/Prognosis good  -AD --       SLC Criteria for Skilled Therapy Interventions Met yes  -AD --       Functional Impact functional impact in social situations;needs occasional supervision;difficulty communicating wants, needs;difficulty in expressing complex messages;restrictions in personal and social life;difficulty completing home management task  -AD --          Recommendations    Therapy Frequency (SLP SLC) daily;2 days per week;3 days per week  -AD --        Predicted Duration Therapy Intervention (Days) 1 week  -AD --       Anticipated Discharge Disposition (SLP) home with home health;anticipate therapy at next level of care  -AD --       Patient/Family Concerns, Anticipated Discharge Disposition (SLP) Pt and son agreeable to home health therapy.  -AD --       Communication Strategy Suggestions eliminate distractions when speaking with patient  -AD --       SLC Diagnostic Follow-Up Needed reading comprehension  -AD --          Communication Treatment Objective and Progress Goals (SLP)    [facility] LTGs Patient will demonstrate functional speech skills for return to discharge environment;Patient will demonstrate functional cognitive-linguistic skills for return to discharge environment  -AD --       Diagnostic Treatment Objective Diagnostic Treatment Objectives (Group)  -AD --       Motor Speech/Dysarthria Treatment Objectives Motor Speech/Dysarthria Treatment Objectives (Group)  -AD --       Cognitive Linguistic Treatment Objectives Cognitive Linguistic Treatment Objectives (Group)  -AD --          Patient will demonstrate functional speech skills for return to discharge environment    [place] with minimal cues;with use of compensatory strategies  -AD --       Time frame 1 week  -AD --       Barriers fatigue  -AD --       Progress/Outcomes new goal  -AD --          Patient will demonstrate functional cognitive-linguistic skills for return to discharge environment    [place] with minimal cues;with moderate cues;with use of compensatory strategies  -AD --       Time frame 1 week  -AD --       Barriers fatigue;baseline deficits  -AD --       Progress/Outcomes new goal  -AD --          Diagnostic Treatment Objectives    Diagnostic Treatment Objective Selection Diagnostic Treatment Objectives  -AD --          SLP Diagnostic Treatment     Patient will participate in further assessment in the following areas reading comprehension  -AD --       Time Frame (Diagnostic) 2  days  -AD --       Barriers (Diagnostic) --  none  -AD --       Progress/Outcomes (Additional Goal 1, SLP) new goal  -AD --          Motor Speech/Dysarthria Treatment Objectives    Articulation Selection articulation, SLP goal 1  -AD --          Articulation Goal 1 (SLP)    Improve Articulation Goal 1 (SLP) by over-articulating at phrase level;90%;with minimal cues (75-90%)  -AD --       Time Frame (Articulation Goal 1, SLP) short term goal (STG);1 week  -AD --       Progress/Outcomes (Articulation Goal 1, SLP) new goal  -AD --          Cognitive Linguistic Treatment Objectives    Memory Skills Selection memory skills, SLP goal 1  -AD --          Memory Skills Goal 1 (SLP)    Improve Memory Skills Through Goal 1 (SLP) listen to a paragraph and answer questions;recalling unrelated word lists with an imposed delay;80%;with minimal cues (75-90%)  -AD --       Time Frame (Memory Skills Goal 1, SLP) short term goal (STG);1 week  -AD --       Progress/Outcomes (Memory Skills Goal 1, SLP) new goal  -AD --             User Key  (r) = Recorded By, (t) = Taken By, (c) = Cosigned By    Initials Name Effective Dates    AD Magdalena Vang MS CCC-SLP 06/16/21 -                    EDUCATION  The patient has been educated in the following areas:     Cognitive Impairment Communication Impairment. Pt and son verbalize understanding of dysarthria and mild neurocognitive disorder. Verbalize understanding of compensatory strategies for articulation/intelligibility. Verbalize understanding of strategies to improve or maintain current cognitive functioning with physical activity, cognitive activity and socialization. Reinforcement needed for patient.            SLP GOALS     Row Name 05/31/23 7465             Patient will demonstrate functional speech skills for return to discharge environment    [place] with minimal cues;with use of compensatory strategies  -AD      Time frame 1 week  -AD      Barriers fatigue  -AD       Progress/Outcomes new goal  -AD         Patient will demonstrate functional cognitive-linguistic skills for return to discharge environment    [place] with minimal cues;with moderate cues;with use of compensatory strategies  -AD      Time frame 1 week  -AD      Barriers fatigue;baseline deficits  -AD      Progress/Outcomes new goal  -AD         Articulation Goal 1 (SLP)    Improve Articulation Goal 1 (SLP) by over-articulating at phrase level;90%;with minimal cues (75-90%)  -AD      Time Frame (Articulation Goal 1, SLP) short term goal (STG);1 week  -AD      Progress/Outcomes (Articulation Goal 1, SLP) new goal  -AD         Memory Skills Goal 1 (SLP)    Improve Memory Skills Through Goal 1 (SLP) listen to a paragraph and answer questions;recalling unrelated word lists with an imposed delay;80%;with minimal cues (75-90%)  -AD      Time Frame (Memory Skills Goal 1, SLP) short term goal (STG);1 week  -AD      Progress/Outcomes (Memory Skills Goal 1, SLP) new goal  -AD            User Key  (r) = Recorded By, (t) = Taken By, (c) = Cosigned By    Initials Name Provider Type    Magdalena Hebert MS CCC-SLP Speech and Language Pathologist                        Time Calculation:      Time Calculation- SLP     Row Name 05/31/23 1816             Time Calculation- SLP    SLP Start Time 1530  -AD      SLP Stop Time 1700  -AD      SLP Time Calculation (min) 90 min  -AD      Total Timed Code Minutes- SLP 0 minute(s)  -AD      SLP Non-Billable Time (min) 0 min  -AD      SLP Received On 05/31/23  -AD         Untimed Charges    SLP Eval/Re-eval  ST Eval Speech and Production w/ Language - 24440  -AD      23671-IB Eval Speech and Production w/ Language Minutes 90  -AD         Total Minutes    Untimed Charges Total Minutes 90  -AD       Total Minutes 90  -AD            User Key  (r) = Recorded By, (t) = Taken By, (c) = Cosigned By    Initials Name Provider Type    Magdalena Hebert MS CCC-SLP Speech and Language  Pathologist                Therapy Charges for Today     Code Description Service Date Service Provider Modifiers Qty    [id number]  ST EVAL SPEECH AND PROD W LANG  6 5/31/2023 Magdalena Vang, MS CCC-SLP GN 1                     Magdalena Vang, MS CCC-SLP  5/31/2023

## 2023-05-31 NOTE — PROGRESS NOTES
"Hospitalist Team      Patient Care Team:  Payton Salter APRN as PCP - General (Family Medicine)      Chief Complaint: Follow-up Acute CVA; Acute DVT    Subjective    Events noted overnight.  No other acute issues.  Mr. Hardwick is up in the chair this morning.  He denies chest pain and dyspnea.  He is tolerating PO.  He hasn't worked w/ PT yet.    Objective    Vital Signs  Temp:  [97.9 °F (36.6 °C)-98.5 °F (36.9 °C)] 97.9 °F (36.6 °C)  Heart Rate:  [56-69] 56  Resp:  [16-20] 16  BP: (156-190)/(78-95) 156/89  Oxygen Therapy  SpO2: 95 %  Device (Oxygen Therapy): room air}    Flowsheet Rows    Flowsheet Row First Filed Value   Admission Height 182.9 cm (72\") Documented at 05/30/2023 1004   Admission Weight 96.4 kg (212 lb 8 oz) Documented at 05/30/2023 1004          Physical Exam:    General: Appears stated age in no acute distress.  Lungs: Breath sounds are clear throughout all fields.  Respirations are non-labored.  CV: Regular rate and rhythm.  No murmurs appreciated.  Radial pulses are 2+ and symmetric.  Abdomen: Soft and non-tender w/ active bowel sounds.  MSK: No C/C/E.  Neuro: CN II-XII grossly intact.  Psych: Pleasant affect.  Ox3.    Results Review:     I reviewed the patient's new clinical results.    Lab Results (last 24 hours)     Procedure Component Value Units Date/Time    POC Glucose Once [[id number]]  (Normal) Collected: 05/31/23 0744    Specimen: Blood Updated: 05/31/23 0750     Glucose 90 mg/dL      Comment: Meter: [id number] : [id number] Saul CROWDER       POC Glucose Once [[id number]]  (Normal) Collected: 05/31/23 0040    Specimen: Blood Updated: 05/31/23 0046     Glucose 88 mg/dL      Comment: Meter: [id number] : [id number] Ashlyn Bullard CNA       TSH [[id number]]  (Normal) Collected: 05/30/23 1036    Specimen: Blood Updated: 05/30/23 2018     TSH 1.610 uIU/mL     Hemoglobin A1c [[id number]]  (Abnormal) Collected: 05/30/23 1036    Specimen: Blood Updated: 05/30/23 1813     Hemoglobin A1C 6.40 %  "    Narrative:      Hemoglobin A1C Ranges:    Increased Risk for Diabetes  5.7% to 6.4%  Diabetes                     >= 6.5%  Diabetic Goal                < 7.0%    Lipid Panel [[id number]] Collected: 05/30/23 1036    Specimen: Blood Updated: 05/30/23 1809     Total Cholesterol 142 mg/dL      Triglycerides 48 mg/dL      HDL Cholesterol 42 mg/dL      LDL Cholesterol  89 mg/dL      VLDL Cholesterol 11 mg/dL      LDL/HDL Ratio 2.15    Narrative:      Cholesterol Reference Ranges  (U.[place]. Department of Health and Human Services ATP III Classifications)    Desirable          <200 mg/dL  Borderline High    200-239 mg/dL  High Risk          >240 mg/dL      Triglyceride Reference Ranges  (U.S. Department of Health and Human Services ATP III Classifications)    Normal           <150 mg/dL  Borderline High  150-199 mg/dL  High             200-499 mg/dL  Very High        >500 mg/dL    HDL Reference Ranges  (U.S. Department of Health and Human Services ATP III Classifications)    Low     <40 mg/dl (major risk factor for CHD)  High    >60 mg/dl ('negative' risk factor for CHD)        LDL Reference Ranges  (U.S. Department of Health and Human Services ATP III Classifications)    Optimal          <100 mg/dL  Near Optimal     100-129 mg/dL  Borderline High  130-159 mg/dL  High             160-189 mg/dL  Very High        >189 mg/dL    Urinalysis, Microscopic Only - Urine, Clean Catch [[id number]]  (Abnormal) Collected: 05/30/23 1131    Specimen: Urine, Clean Catch Updated: 05/30/23 1202     RBC, UA 3-5 /HPF      WBC, UA None Seen /HPF      Bacteria, UA None Seen /HPF      Squamous Epithelial Cells, UA 0-2 /HPF      Hyaline Casts, UA 0-2 /LPF      Methodology Manual Light Microscopy    Urinalysis With Microscopic If Indicated (No Culture) - Urine, Clean Catch [[id number]]  (Abnormal) Collected: 05/30/23 1131    Specimen: Urine, Clean Catch Updated: 05/30/23 1142     Color, UA Yellow     Appearance, UA Clear     pH, UA 7.0     Specific  Gravity, UA 1.020     Glucose, UA Negative     Ketones, UA Negative     Bilirubin, UA Negative     Blood, UA Small (1+)     Protein, UA Negative     Leuk Esterase, UA Negative     Nitrite, UA Negative     Urobilinogen, UA 0.2 E.U./dL    Single High Sensitivity Troponin T [[id number]]  (Normal) Collected: 05/30/23 1036    Specimen: Blood Updated: 05/30/23 1103     HS Troponin T 12 ng/L     Narrative:      High Sensitive Troponin T Reference Range:  <10.0 ng/L- Negative Female for AMI  <15.0 ng/L- Negative Male for AMI  >=10 - Abnormal Female indicating possible myocardial injury.  >=15 - Abnormal Male indicating possible myocardial injury.   Clinicians would have to utilize clinical acumen, EKG, Troponin, and serial changes to determine if it is an Acute Myocardial Infarction or myocardial injury due to an underlying chronic condition.         Comprehensive Metabolic Panel [[id number]]  (Abnormal) Collected: 05/30/23 1036    Specimen: Blood Updated: 05/30/23 1101     Glucose 148 mg/dL      BUN 11 mg/dL      Creatinine 1.14 mg/dL      Sodium 140 mmol/L      Potassium 4.0 mmol/L      Chloride 105 mmol/L      CO2 24.6 mmol/L      Calcium 9.0 mg/dL      Total Protein 7.1 g/dL      Albumin 4.2 g/dL      ALT (SGPT) 8 U/L      AST (SGOT) 13 U/L      Alkaline Phosphatase 80 U/L      Total Bilirubin 0.9 mg/dL      Globulin 2.9 gm/dL      A/G Ratio 1.4 g/dL      BUN/Creatinine Ratio 9.6     Anion Gap 10.4 mmol/L      eGFR 68.3 mL/min/1.73     Narrative:      GFR Normal >60  Chronic Kidney Disease <60  Kidney Failure <15    The GFR formula is only valid for adults with stable renal function between ages 18 and 70.    CBC & Differential [[id number]]  (Normal) Collected: 05/30/23 1036    Specimen: Blood Updated: 05/30/23 1046    Narrative:      The following orders were created for panel order CBC & Differential.  Procedure                               Abnormality         Status                     ---------                                -----------         ------                     CBC Auto Differential[[id number]]        Normal              Final result                 Please view results for these tests on the individual orders.    CBC Auto Differential [[id number]]  (Normal) Collected: 05/30/23 1036    Specimen: Blood Updated: 05/30/23 1046     WBC 6.74 10*3/mm3      RBC 5.51 10*6/mm3      Hemoglobin 16.4 g/dL      Hematocrit 50.2 %      MCV 91.1 fL      MCH 29.8 pg      MCHC 32.7 g/dL      RDW 12.5 %      RDW-SD 42.7 fl      MPV 10.1 fL      Platelets 291 10*3/mm3      Neutrophil % 49.8 %      Lymphocyte % 34.4 %      Monocyte % 9.6 %      Eosinophil % 4.6 %      Basophil % 1.3 %      Immature Grans % 0.3 %      Neutrophils, Absolute 3.35 10*3/mm3      Lymphocytes, Absolute 2.32 10*3/mm3      Monocytes, Absolute 0.65 10*3/mm3      Eosinophils, Absolute 0.31 10*3/mm3      Basophils, Absolute 0.09 10*3/mm3      Immature Grans, Absolute 0.02 10*3/mm3           Imaging Results (Last 24 Hours)     Procedure Component Value Units Date/Time    MRI Angiogram Head Without Contrast [[id number]] Resulted: 05/31/23 0840     Updated: 05/31/23 0950    MRI Brain Without Contrast [[id number]] Collected: 05/30/23 1435     Updated: 05/30/23 1518    Narrative:      MRI BRAIN, 05/30/2023         HISTORY:  72-year-old male presenting to the ED with 2-3 day history of unsteady  gait, slurred speech. Past history of CVA.     TECHNIQUE:  Multisequence, multiplanar noncontrast MR imaging of the brain.     COMPARISON:  *  CT head, today.  *  MRI brain, 06/23/2021.     FINDINGS:  Single tiny focus of mildly restricted diffusion in the left  periventricular parietal centrum semiovale measuring about 6 mm, not  present on the prior exam. Likely focus of subacute ischemic insult.     No additional evidence of acute or subacute ischemia or other restricted  diffusion.     No evidence of acute intracranial hemorrhage.     Old left occipital lobe cortical and subcortical  infarct in the PCA  territory, unchanged. Chronic multifocal lacunar infarcts in the basal  ganglia bilaterally. Severe diffuse chronic small vessel type white  matter changes, nonspecific but stable since the prior study.     Chronic generalized cerebral volume loss, greatest in the frontotemporal  regions, unchanged. Major cerebrovascular flow voids are preserved.       Impression:      1.  Tiny, likely subacute white matter infarct in the deep left parietal  white matter measuring about 6 mm, not hemorrhagic.  2.  No other evidence of acute or subacute ischemia or additional  restricted diffusion.  3.  Stable old left occipital infarct. Old bilateral basal ganglia  lacunar infarcts.  4.  Stable diffuse chronic changes as noted above.     This report was finalized on 5/30/2023 2:45 PM by Dr. Cory Schaeffer MD.       CT Head Without Contrast [[id number]] Collected: 05/30/23 1105     Updated: 05/30/23 1257    Narrative:      CT HEAD, NONCONTRAST, 05/30/2023     HISTORY:  72-year-old male in the ED with 2-3 day history of unsteady gait,  slurred speech. Past history of CVA.     TECHNIQUE:    CT imaging of the head without IV contrast. Radiation dose reduction  techniques included automated exposure control or exposure modulation  based on body size. Radiation audit for CT and nuclear cardiology exams  in the last 12 months: 1.     COMPARISON:  *  CT head, 05/04/2021.     FINDINGS:    No acute intracranial abnormality is demonstrated.     Advanced generalized chronic cerebral volume loss, greatest over the  frontotemporal lobes where there are small chronic low-attenuation  subdural hygromas. Old midline left occipital infarct in PCA  distribution. Severe diffuse chronic small vessel type white matter  changes. Old lacunar infarcts in bilateral basal ganglia. These findings  are stable since the prior study.     No evidence of acute intracranial hemorrhage. No visible mass, mass  effect, convincing acute cerebral  edema, enlarging extra-axial fluid  collection or progressive ventricular enlargement. Visualized upper  paranasal sinuses and mastoid air spaces are clear.       Impression:      1.  No acute intracranial abnormality. No evidence of acute intracranial  hemorrhage.  2.  Advanced atrophy, greater over the frontotemporal lobes where there  are small chronic low attenuation subdural hygromas that are stable.  3.  Old left occipital infarct. Old bilateral basal ganglia lacunar  infarcts. Extensive chronic white matter changes.  4.  No significant change since 05/04/2021.     This report was finalized on 5/30/2023 11:10 AM by Dr. Cory Schaeffer MD.             Medication Review:   I have reviewed the patient's current medication list    Current Facility-Administered Medications:   •  amLODIPine (NORVASC) tablet 5 mg, 5 mg, Oral, Daily, Emery Tom MD, 5 mg at 05/31/23 0920  •  aspirin tablet 325 mg, 325 mg, Oral, Daily, 325 mg at 05/31/23 0920 **OR** aspirin suppository 300 mg, 300 mg, Rectal, Daily, Emery Tom MD  •  atorvastatin (LIPITOR) tablet 80 mg, 80 mg, Oral, Nightly, Rene Lopez MD, 80 mg at 05/30/23 2053  •  [COMPLETED] clopidogrel (PLAVIX) tablet 300 mg, 300 mg, Oral, Once, 300 mg at 05/30/23 1632 **AND** clopidogrel (PLAVIX) tablet 75 mg, 75 mg, Oral, Daily, Rene Lopez MD, 75 mg at 05/31/23 0920  •  hydrALAZINE (APRESOLINE) injection 10 mg, 10 mg, Intravenous, Q6H PRN, Emery Tom MD  •  losartan (COZAAR) tablet 100 mg, 100 mg, Oral, Daily, Emery Tom MD, 100 mg at 05/31/23 0920  •  nitroglycerin (NITROSTAT) SL tablet 0.4 mg, 0.4 mg, Sublingual, Q5 Min PRN, Emery Tom MD  •  sodium chloride 0.9 % flush 10 mL, 10 mL, Intravenous, Q12H, Rene Lopez MD, 10 mL at 05/31/23 0920  •  sodium chloride 0.9 % flush 10 mL, 10 mL, Intravenous, PRN, Rene Lopez MD  •  sodium chloride 0.9 % infusion 40 mL, 40 mL, Intravenous, PRN,  Rene Lopez MD      Assessment & Plan     1.  Acute CVA: Strong suspicion this was indeed embolic given the PFO found on echo.  Discussed w/ Tele stroke and will order a Zio patch.  Given the presence of the DVT, will start Eliquis and stop Plavix.  Continue ASA.  PT recommends home health and a walker.  Added nursing for medication management.    2.  Acute RLE DVT: Starting Eliquis as above.  Received Plavix this morning so will start Eliquis in A.M.    3.  Pre-diabetes: Blood glucose has been at goal.  Diabetic educator has been consulted.    4.  Essential Hypertension: Not at goal on exam and trend is up.  He continues on both of his home medications so allowing for some permissive HTN, will continue to monitor.  Hydralazine prn is available for SBP>180mmHg.       Plan for disposition: Home w/ HH after Zio placed.    Emery Tom MD  05/31/23  10:43 EDT

## 2023-05-31 NOTE — SIGNIFICANT NOTE
05/31/23 1000   Communication Assessment/Intervention   Session Not Performed patient unavailable for evaluation   Evaluation Not Performed, Comment Pt currently with PT at this time. Will check back later.

## 2023-05-31 NOTE — PROGRESS NOTES
Telemetry shows sinus lizbeth, rate in 40s. EKG in am. Troponin earlier negative and patient is asymptomatic, sleeping with respirations even.

## 2023-05-31 NOTE — PLAN OF CARE
Goal Outcome Evaluation:  Plan of Care Reviewed With: patient        Progress: no change  Outcome Evaluation: patient has been resting well much of the day. Had MRI and Venous Doppler done today. DVT in right leg per venous doppler. PLan for patient to wear holter monitor at discharge. educated patient on modifiable risk factors of stroke.

## 2023-05-31 NOTE — ACP (ADVANCE CARE PLANNING)
Advance Care Planning     Education and discussion with patient and son concerning ACP.  Patient completed living will.  Appointed son Cory Hardwick as his health care surrogate.      Copy scanned to chart.    Chaplain Dot

## 2023-05-31 NOTE — PROGRESS NOTES
Adult Nutrition  Assessment/PES    Patient Name:  Cory Hardwick  YOB: 1950  MRN: [id number]  Admit Date:  5/30/2023    Assessment Date:  5/31/2023    Comments:  Good po intake x 1 meal.   Pt with PT at visit to room.  Encourage po and voice food prefs.     Will cont to follow and monitor, provide edu as indicated.     Reason for Assessment     Row Name 05/31/23 1136          Reason for Assessment    Reason For Assessment identified at risk by screening criteria  diff chew/swallow     Diagnosis neurologic conditions  Acute CVA hx pre-DM, HTN                Nutrition/Diet History     Row Name 05/31/23 1136          Nutrition/Diet History    Typical Intake (Food/Fluid/EN/PN) Pt wtih PT at visit to room.                Labs/Tests/Procedures/Meds     Row Name 05/31/23 1137          Labs/Procedures/Meds    Lab Results Reviewed reviewed     Lab Results Comments glu , HgA1c 6.4        Diagnostic Tests/Procedures    Diagnostic Test/Procedure Reviewed reviewed     Diagnostic Test/Procedures Comments SLP eval pending        Medications    Pertinent Medications Reviewed reviewed                  Estimated/Assessed Needs - Anthropometrics     Row Name 05/31/23 1137          Anthropometrics    Weight for Calculation 98.3 kg (216 lb 11.2 oz)        Estimated/Assessed Needs    Additional Documentation Estimated Calorie Needs (Group);Fluid Requirements (Group);Protein Requirements (Group)        Estimated Calorie Needs    Estimated Calorie Requirement (kcal/day) 2304 kcal ( mifflin 1.3)     Estimated Calorie Need Method [place]-St Jeor        Protein Requirements    Est Protein Requirement Amount (gms/kg) 1.0 gm protein   gm pro ( 1-1.2 gm/kg pro )        Fluid Requirements    Estimated Fluid Requirement Method RDA Method  2304 ml                Nutrition Prescription Ordered     Row Name 05/31/23 1139          Nutrition Prescription PO    Current PO Diet Soft Texture     Texture Chopped     Common  Modifiers Cardiac;Consistent Carbohydrate                Evaluation of Received Nutrient/Fluid Intake     Row Name 05/31/23 1139          Fluid Intake Evaluation    Oral Fluid (mL) 240  insufficient data        PO Evaluation    Number of Days PO Intake Evaluated Insufficient Data     Number of Meals 1     % PO Intake 100                   Problem/Interventions:   Problem 1     Row Name 05/31/23 1139          Nutrition Diagnoses Problem 1    Problem 1 Biting/Chewing Difficulty     Etiology (related to) Medical Diagnosis;Factors Affecting Nutrition     Signs/Symptoms (evidenced by) Report/Observation                      Intervention Goal     Row Name 05/31/23 1139          Intervention Goal    General Meet nutritional needs for age/condition     PO Continue positive trend;PO intake (%)     PO Intake % 75 %  or greater                Nutrition Intervention     Row Name 05/31/23 1140          Nutrition Intervention    RD/Tech Action Follow Tx progress                  Education/Evaluation     Row Name 05/31/23 1140          Education    Education Education not appropriate at this time        Monitor/Evaluation    Monitor Per protocol;I&O;PO intake;Pertinent labs;Weight;Symptoms                 Electronically signed by:  Christelle Crooks RD  05/31/23 11:40 EDT

## 2023-06-01 ENCOUNTER — READMISSION MANAGEMENT (OUTPATIENT)
Dept: CALL CENTER | Facility: HOSPITAL | Age: 73
End: 2023-06-01

## 2023-06-01 ENCOUNTER — APPOINTMENT (OUTPATIENT)
Dept: CARDIOLOGY | Facility: HOSPITAL | Age: 73
End: 2023-06-01
Payer: MEDICARE

## 2023-06-01 VITALS
RESPIRATION RATE: 16 BRPM | BODY MASS INDEX: 29.35 KG/M2 | WEIGHT: 216.71 LBS | HEART RATE: 54 BPM | HEIGHT: 72 IN | TEMPERATURE: 97.8 F | SYSTOLIC BLOOD PRESSURE: 177 MMHG | DIASTOLIC BLOOD PRESSURE: 80 MMHG | OXYGEN SATURATION: 92 %

## 2023-06-01 LAB
ALBUMIN SERPL-MCNC: 3.9 G/DL (ref 3.5–5.2)
ALBUMIN/GLOB SERPL: 1.4 G/DL
ALP SERPL-CCNC: 79 U/L (ref 39–117)
ALT SERPL W P-5'-P-CCNC: 9 U/L (ref 1–41)
ANION GAP SERPL CALCULATED.3IONS-SCNC: 9.9 MMOL/L (ref 5–15)
AST SERPL-CCNC: 11 U/L (ref 1–40)
BASOPHILS # BLD AUTO: 0.12 10*3/MM3 (ref 0–0.2)
BASOPHILS NFR BLD AUTO: 1.3 % (ref 0–1.5)
BILIRUB SERPL-MCNC: 1.5 MG/DL (ref 0–1.2)
BUN SERPL-MCNC: 17 MG/DL (ref 8–23)
BUN/CREAT SERPL: 16 (ref 7–25)
CALCIUM SPEC-SCNC: 9.2 MG/DL (ref 8.6–10.5)
CHLORIDE SERPL-SCNC: 102 MMOL/L (ref 98–107)
CO2 SERPL-SCNC: 24.1 MMOL/L (ref 22–29)
CREAT SERPL-MCNC: 1.06 MG/DL (ref 0.76–1.27)
DEPRECATED RDW RBC AUTO: 41.6 FL (ref 37–54)
EGFRCR SERPLBLD CKD-EPI 2021: 74.6 ML/MIN/1.73
EOSINOPHIL # BLD AUTO: 0.39 10*3/MM3 (ref 0–0.4)
EOSINOPHIL NFR BLD AUTO: 4.2 % (ref 0.3–6.2)
ERYTHROCYTE [DISTWIDTH] IN BLOOD BY AUTOMATED COUNT: 12.6 % (ref 12.3–15.4)
GLOBULIN UR ELPH-MCNC: 2.8 GM/DL
GLUCOSE SERPL-MCNC: 117 MG/DL (ref 65–99)
HCT VFR BLD AUTO: 47.9 % (ref 37.5–51)
HGB BLD-MCNC: 16.2 G/DL (ref 13–17.7)
IMM GRANULOCYTES # BLD AUTO: 0.03 10*3/MM3 (ref 0–0.05)
IMM GRANULOCYTES NFR BLD AUTO: 0.3 % (ref 0–0.5)
LYMPHOCYTES # BLD AUTO: 2.29 10*3/MM3 (ref 0.7–3.1)
LYMPHOCYTES NFR BLD AUTO: 24.8 % (ref 19.6–45.3)
MAXIMAL PREDICTED HEART RATE: 148 BPM
MCH RBC QN AUTO: 30.4 PG (ref 26.6–33)
MCHC RBC AUTO-ENTMCNC: 33.8 G/DL (ref 31.5–35.7)
MCV RBC AUTO: 89.9 FL (ref 79–97)
MONOCYTES # BLD AUTO: 0.85 10*3/MM3 (ref 0.1–0.9)
MONOCYTES NFR BLD AUTO: 9.2 % (ref 5–12)
NEUTROPHILS NFR BLD AUTO: 5.55 10*3/MM3 (ref 1.7–7)
NEUTROPHILS NFR BLD AUTO: 60.2 % (ref 42.7–76)
NRBC BLD AUTO-RTO: 0 /100 WBC (ref 0–0.2)
PLATELET # BLD AUTO: 261 10*3/MM3 (ref 140–450)
PMV BLD AUTO: 10.5 FL (ref 6–12)
POTASSIUM SERPL-SCNC: 3.7 MMOL/L (ref 3.5–5.2)
PROT SERPL-MCNC: 6.7 G/DL (ref 6–8.5)
RBC # BLD AUTO: 5.33 10*6/MM3 (ref 4.14–5.8)
SODIUM SERPL-SCNC: 136 MMOL/L (ref 136–145)
STRESS TARGET HR: 126 BPM
WBC NRBC COR # BLD: 9.23 10*3/MM3 (ref 3.4–10.8)

## 2023-06-01 PROCEDURE — 93246 EXT ECG>7D<15D RECORDING: CPT

## 2023-06-01 PROCEDURE — 99238 HOSP IP/OBS DSCHRG MGMT 30/<: CPT | Performed by: INTERNAL MEDICINE

## 2023-06-01 PROCEDURE — 85025 COMPLETE CBC W/AUTO DIFF WBC: CPT | Performed by: HOSPITALIST

## 2023-06-01 PROCEDURE — G0378 HOSPITAL OBSERVATION PER HR: HCPCS

## 2023-06-01 PROCEDURE — 80053 COMPREHEN METABOLIC PANEL: CPT | Performed by: HOSPITALIST

## 2023-06-01 RX ORDER — AMLODIPINE BESYLATE 10 MG/1
10 TABLET ORAL DAILY
Qty: 30 TABLET | Refills: 0 | Status: SHIPPED | OUTPATIENT
Start: 2023-06-01

## 2023-06-01 RX ORDER — ATORVASTATIN CALCIUM 80 MG/1
80 TABLET, FILM COATED ORAL NIGHTLY
Qty: 30 TABLET | Refills: 0 | Status: SHIPPED | OUTPATIENT
Start: 2023-06-01

## 2023-06-01 RX ORDER — ASPIRIN 81 MG/1
81 TABLET ORAL DAILY
Qty: 30 TABLET | Refills: 0 | Status: SHIPPED | OUTPATIENT
Start: 2023-06-01

## 2023-06-01 RX ADMIN — ASPIRIN 81 MG: 81 TABLET, COATED ORAL at 08:14

## 2023-06-01 RX ADMIN — APIXABAN 5 MG: 2.5 TABLET, FILM COATED ORAL at 08:14

## 2023-06-01 RX ADMIN — LOSARTAN POTASSIUM 100 MG: 50 TABLET, FILM COATED ORAL at 08:14

## 2023-06-01 RX ADMIN — Medication 10 ML: at 08:14

## 2023-06-01 RX ADMIN — AMLODIPINE BESYLATE 5 MG: 5 TABLET ORAL at 08:14

## 2023-06-01 NOTE — CASE MANAGEMENT/SOCIAL WORK
Continued Stay Note  MANI Hernandez     Patient Name: Cory Hardwick  MRN: [id number]  Today's Date: 6/1/2023    Admit Date: 5/30/2023    Plan: Plan home/[facility]   Discharge Plan     Row Name 06/01/23 1026       Plan    Plan Plan home/[facility]    Patient/Family in Agreement with Plan yes    Plan Comments Per MD sandra requests SLP to follow with . LVM Kenzie/Emy to update for additional discipline to see patient. New order entered. CM will continue to follow.    Row Name 06/01/23 0938       Plan    Plan plan home with [facility]    Plan Comments Spoke with Kenzie/Emy  to update that patient will dc home today. CM will continue to follow.               Discharge Codes    No documentation.               Expected Discharge Date and Time     Expected Discharge Date Expected Discharge Time    Jun 1, 2023             Huber Moss RN

## 2023-06-01 NOTE — CASE MANAGEMENT/SOCIAL WORK
Continued Stay Note  MANI Hernandez     Patient Name: Cory Hardwick  MRN: [id number]  Today's Date: 6/1/2023    Admit Date: 5/30/2023    Plan: plan home with [facility]   Discharge Plan     Row Name 06/01/23 0938       Plan    Plan plan home with [facility]    Plan Comments Spoke with Kenzie/Emy ESCOBAR to update that patient will dc home today. CM will continue to follow.               Discharge Codes    No documentation.               Expected Discharge Date and Time     Expected Discharge Date Expected Discharge Time    Jun 1, 2023             Huber Moss RN

## 2023-06-01 NOTE — DISCHARGE SUMMARY
Cory Hardwick  1950  [id number]    Hospitalists Discharge Summary    Date of Admission: 5/30/2023  Date of Discharge:  6/1/2023    History of Present Illness from [facility] on admit: Mr. Hardwick is a pleasant, 73 y/o  male who was noticed to have slurred speech on Saturday morning.  Mr. Hardwick himself does not recall this, but his son, who is at the bedside describes being called and told that he should call his father because of his speech.  His son does report that his speech was slurred.  Later that day, he took him to [employer] to go shopping.  He reports that his father will always walk around the store, but this time he rode.  He also reports some chronic weakness from a prior hip repair.  No events were noted on Sunday, but at [employer] lunch, a nurse-friend noticed a slight facial droop.  They called his PCP this morning who told them to bring Mr. Hardwick to the ER.     His son does also describe some confusion that seems to wax and wane as he will ask the same question multiple times.  However, he doesn't describe this as an acutely different change.  He also doesn't seem to manage his medications or take them correctly.  He does not routinely check his blood glucose.     Primary Discharge diagnoses: Acute CVA    Secondary Discharge Diagnoses: Acute right lower extremity DVT-Eliquis initiated.  Prediabetes-blood glucose has been at goal, diabetic educator consulted, defer to PCP for further work-up.  Essential hypertension-blood pressure elevated beyond goal therefore increasing amlodipine dosing at discharge    Hospital Course Summary: Patient was admitted for stroke symptoms, found to have acute CVA, later found to have PFO on echocardiogram as well as right lower extremity DVT, case further discussed with stroke neurology and patient was started on Eliquis in addition to his aspirin with discontinuation of his Plavix.  Patient was seen in consultation by physical therapy who recommended home health  and walker which was provided at discharge.  Patient's blood pressure remained elevated beyond goal despite his home medications therefore amlodipine was increased to 10 mg daily on discharge.  Patient also noted to have prediabetes but blood glucose has been at goal he was seen by diabetic educator in consultation and will defer further management to his PCP.  Stroke neurology recommended Holter be placed prior to discharge, this was done and results will be sent to his PCP. On 6/1/2023 patient's condition had improved. They were deemed stable for discharge. They were advised to take all medications as prescribed, follow up with PCP within 1 week. If there are any issues patient should contact PCP and/or return to the ED for follow up. Patient was agreeable to the plan and subsequently discharged at this time.     PCP  Patient Care Team:  Payton Salter APRN as PCP - General (Family Medicine)    Consults:   Consults     Date and Time Order Name Status Description    5/30/2023  4:07 PM Inpatient Neurology Consult Stroke            Operations and Procedures Performed:       Adult Transthoracic Echo Complete W/ Cont if Necessary Per Protocol (With Agitated Saline)    Result Date: 5/30/2023  Narrative: •  Left ventricular systolic function is normal. Left ventricular ejection fraction appears to be 61 - 65%. •  Left ventricular wall thickness is consistent with mild concentric hypertrophy. •  Left ventricular diastolic function is consistent with (grade I) impaired relaxation. •  The left atrial cavity is mild to moderately dilated. •  The agitated saline study is positive, consistent with a small to moderate size PFO •  Mild aortic valve regurgitation is present. •  There is no evidence of pericardial effusion     CT Head Without Contrast    Result Date: 5/30/2023  Narrative: CT HEAD, NONCONTRAST, 05/30/2023  HISTORY: 72-year-old male in the ED with 2-3 day history of unsteady gait, slurred speech. Past history of  CVA.  TECHNIQUE:  CT imaging of the head without IV contrast. Radiation dose reduction techniques included automated exposure control or exposure modulation based on body size. Radiation audit for CT and nuclear cardiology exams in the last 12 months: 1.  COMPARISON: *  CT head, 05/04/2021.  FINDINGS:  No acute intracranial abnormality is demonstrated.  Advanced generalized chronic cerebral volume loss, greatest over the frontotemporal lobes where there are small chronic low-attenuation subdural hygromas. Old midline left occipital infarct in PCA distribution. Severe diffuse chronic small vessel type white matter changes. Old lacunar infarcts in bilateral basal ganglia. These findings are stable since the prior study.  No evidence of acute intracranial hemorrhage. No visible mass, mass effect, convincing acute cerebral edema, enlarging extra-axial fluid collection or progressive ventricular enlargement. Visualized upper paranasal sinuses and mastoid air spaces are clear.      Impression: 1.  No acute intracranial abnormality. No evidence of acute intracranial hemorrhage. 2.  Advanced atrophy, greater over the frontotemporal lobes where there are small chronic low attenuation subdural hygromas that are stable. 3.  Old left occipital infarct. Old bilateral basal ganglia lacunar infarcts. Extensive chronic white matter changes. 4.  No significant change since 05/04/2021.  This report was finalized on 5/30/2023 11:10 AM by Dr. Cory Schaeffer MD.      MRI Angiogram Head Without Contrast, MRI Angiogram Neck With & Without Contrast    Result Date: 5/31/2023  Narrative: MRI ANGIOGRAM HEAD WO CONTRAST-, MRI ANGIOGRAM NECK W WO CONTRAST-: 5/31/2023 8:40 AM  CLINICAL HISTORY: Small stroke seen by MRI, strokelike symptoms, prior history of stroke, slurred speech and difficulty walking for 4 days.  TECHNIQUE: MR angiogram of both the head and the neck. MRA of the head performed utilizing standard noncontrast time-of-flight  techniques with 3-D reconstructions. Urinary of the neck performed both without and with IV contrast utilizing time-of-flight techniques and postcontrast imaging with 3-D reconstructions. Assessment for significant cervical ICA stenosis measured as per NASCET criteria.  COMPARISON: MRI of the brain performed 05/30/2023  FINDINGS:  MRA HEAD:  No focal intracranial arterial vascular occlusion.  Short segment high-grade narrowing of the left M1 segment.  Otherwise, mild luminal irregularities of the intracranial vasculature likely reflects atherosclerosis without additional high-grade narrowing identified. No discrete saccular aneurysm is identified.  MRA NECK:  The right vertebral artery appears to have proximal V2 and V1 segments being bifurcated. The left vertebral artery is widely patent. No convincing high-grade stenosis involving the carotid vasculature on either side.      Impression:  1.  No intracranial arterial vascular occlusion. 2.  Short segment high-grade narrowing of the left M1 segment. 3.  Variant anatomy of the right vertebral artery. No flow-limiting stenosis of the larger carotid or vertebral arterial vasculature of the neck.  This report was finalized on 5/31/2023 3:41 PM by Bairon Norman MD.      MRI Brain Without Contrast    Result Date: 5/30/2023  Narrative: MRI BRAIN, 05/30/2023     HISTORY: 72-year-old male presenting to the ED with 2-3 day history of unsteady gait, slurred speech. Past history of CVA.  TECHNIQUE: Multisequence, multiplanar noncontrast MR imaging of the brain.  COMPARISON: *  CT head, today. *  MRI brain, 06/23/2021.  FINDINGS: Single tiny focus of mildly restricted diffusion in the left periventricular parietal centrum semiovale measuring about 6 mm, not present on the prior exam. Likely focus of subacute ischemic insult.  No additional evidence of acute or subacute ischemia or other restricted diffusion.  No evidence of acute intracranial hemorrhage.  Old left occipital lobe  cortical and subcortical infarct in the PCA territory, unchanged. Chronic multifocal lacunar infarcts in the basal ganglia bilaterally. Severe diffuse chronic small vessel type white matter changes, nonspecific but stable since the prior study.  Chronic generalized cerebral volume loss, greatest in the frontotemporal regions, unchanged. Major cerebrovascular flow voids are preserved.      Impression: 1.  Tiny, likely subacute white matter infarct in the deep left parietal white matter measuring about 6 mm, not hemorrhagic. 2.  No other evidence of acute or subacute ischemia or additional restricted diffusion. 3.  Stable old left occipital infarct. Old bilateral basal ganglia lacunar infarcts. 4.  Stable diffuse chronic changes as noted above.  This report was finalized on 5/30/2023 2:45 PM by Dr. Cory Schaeffer MD.      US Venous Doppler Lower Extremity Bilateral (duplex)    Result Date: 5/31/2023  Narrative: VENOUS DOPPLER ULTRASOUND, BILATERAL LOWER EXTREMITIES, COMPLETE, 05/31/2023  HISTORY: 72-year-old male hospital inpatient undergoing acute stroke assessment. Patent foramen ovale on echo. Assess for lower extremity thromboembolic source.  TECHNIQUE: Venous Doppler ultrasound examination of both legs was performed using grey-scale, spectral Doppler, and color flow Doppler ultrasound imaging. Protocol includes popliteal and deep calf veins and the great saphenous vein.  FINDINGS: On the right, there is partially occlusive deep venous thrombus within the common femoral vein and throughout the femoral vein in the thigh. No DVT is seen in the popliteal vein or calf veins or within the deep femoral vein. Great saphenous vein is patent.  On the left, the examination is negative. There is no DVT from the groin to the lower calf. Great saphenous vein is patent.      Impression: 1.  Nonocclusive right leg DVT from the common femoral vein at the groin through the femoral vein in the distal thigh. 2.  No additional DVT  in either leg. No SVT.  This report was finalized on 5/31/2023 4:07 PM by Dr. Cory Schaeffer MD.        Allergies:  is allergic to antazoline, hydrocodone-acetaminophen, and metformin.    [place]  Reviewed     Discharge Medications:     Discharge Medications      New Medications      Instructions Start Date   apixaban 5 MG tablet tablet  Commonly known as: ELIQUIS   5 mg, Oral, Every 12 Hours Scheduled      aspirin 81 MG EC tablet   81 mg, Oral, Daily         Changes to Medications      Instructions Start Date   amLODIPine 10 MG tablet  Commonly known as: NORVASC  What changed:   · medication strength  · how much to take   10 mg, Oral, Daily      atorvastatin 80 MG tablet  Commonly known as: LIPITOR  What changed:   · medication strength  · how much to take  · when to take this   80 mg, Oral, Nightly         Continue These Medications      Instructions Start Date   losartan 100 MG tablet  Commonly known as: COZAAR   100 mg, Oral, Daily      potassium chloride 10 MEQ CR tablet   10 mEq, Oral, Daily      tamsulosin 0.4 MG capsule 24 hr capsule  Commonly known as: FLOMAX   0.4 mg, Oral, Daily             Last Lab Results:   Lab Results (most recent)     Procedure Component Value Units Date/Time    Comprehensive Metabolic Panel [[id number]]  (Abnormal) Collected: 06/01/23 0351    Specimen: Blood Updated: 06/01/23 0455     Glucose 117 mg/dL      BUN 17 mg/dL      Creatinine 1.06 mg/dL      Sodium 136 mmol/L      Potassium 3.7 mmol/L      Chloride 102 mmol/L      CO2 24.1 mmol/L      Calcium 9.2 mg/dL      Total Protein 6.7 g/dL      Albumin 3.9 g/dL      ALT (SGPT) 9 U/L      AST (SGOT) 11 U/L      Alkaline Phosphatase 79 U/L      Total Bilirubin 1.5 mg/dL      Globulin 2.8 gm/dL      A/G Ratio 1.4 g/dL      BUN/Creatinine Ratio 16.0     Anion Gap 9.9 mmol/L      eGFR 74.6 mL/min/1.73     Narrative:      GFR Normal >60  Chronic Kidney Disease <60  Kidney Failure <15    The GFR formula is only valid for adults with  stable renal function between ages 18 and 70.    CBC & Differential [[id number]]  (Normal) Collected: 06/01/23 0351    Specimen: Blood Updated: 06/01/23 0427    Narrative:      The following orders were created for panel order CBC & Differential.  Procedure                               Abnormality         Status                     ---------                               -----------         ------                     CBC Auto Differential[[id number]]        Normal              Final result                 Please view results for these tests on the individual orders.    CBC Auto Differential [[id number]]  (Normal) Collected: 06/01/23 0351    Specimen: Blood Updated: 06/01/23 0427     WBC 9.23 10*3/mm3      RBC 5.33 10*6/mm3      Hemoglobin 16.2 g/dL      Hematocrit 47.9 %      MCV 89.9 fL      MCH 30.4 pg      MCHC 33.8 g/dL      RDW 12.6 %      RDW-SD 41.6 fl      MPV 10.5 fL      Platelets 261 10*3/mm3      Neutrophil % 60.2 %      Lymphocyte % 24.8 %      Monocyte % 9.2 %      Eosinophil % 4.2 %      Basophil % 1.3 %      Immature Grans % 0.3 %      Neutrophils, Absolute 5.55 10*3/mm3      Lymphocytes, Absolute 2.29 10*3/mm3      Monocytes, Absolute 0.85 10*3/mm3      Eosinophils, Absolute 0.39 10*3/mm3      Basophils, Absolute 0.12 10*3/mm3      Immature Grans, Absolute 0.03 10*3/mm3      nRBC 0.0 /100 WBC     POC Glucose Once [[id number]]  (Normal) Collected: 05/31/23 0744    Specimen: Blood Updated: 05/31/23 0750     Glucose 90 mg/dL      Comment: Meter: [id number] : [id number] Saulirineo Lou LAKESHA       POC Glucose Once [[id number]]  (Normal) Collected: 05/31/23 0040    Specimen: Blood Updated: 05/31/23 0046     Glucose 88 mg/dL      Comment: Meter: [id number] : [id number] Ashlyn Aleah SERINA       TSH [[id number]]  (Normal) Collected: 05/30/23 1036    Specimen: Blood Updated: 05/30/23 2018     TSH 1.610 uIU/mL     Hemoglobin A1c [[id number]]  (Abnormal) Collected: 05/30/23 1036    Specimen: Blood Updated: 05/30/23  1813     Hemoglobin A1C 6.40 %     Narrative:      Hemoglobin A1C Ranges:    Increased Risk for Diabetes  5.7% to 6.4%  Diabetes                     >= 6.5%  Diabetic Goal                < 7.0%    Lipid Panel [[id number]] Collected: 05/30/23 1036    Specimen: Blood Updated: 05/30/23 1809     Total Cholesterol 142 mg/dL      Triglycerides 48 mg/dL      HDL Cholesterol 42 mg/dL      LDL Cholesterol  89 mg/dL      VLDL Cholesterol 11 mg/dL      LDL/HDL Ratio 2.15    Narrative:      Cholesterol Reference Ranges  (U.S. Department of Health and Human Services ATP III Classifications)    Desirable          <200 mg/dL  Borderline High    200-239 mg/dL  High Risk          >240 mg/dL      Triglyceride Reference Ranges  (U.S. Department of Health and Human Services ATP III Classifications)    Normal           <150 mg/dL  Borderline High  150-199 mg/dL  High             200-499 mg/dL  Very High        >500 mg/dL    HDL Reference Ranges  (U.S. Department of Health and Human Services ATP III Classifications)    Low     <40 mg/dl (major risk factor for CHD)  High    >60 mg/dl ('negative' risk factor for CHD)        LDL Reference Ranges  (U.S. Department of Health and Human Services ATP III Classifications)    Optimal          <100 mg/dL  Near Optimal     100-129 mg/dL  Borderline High  130-159 mg/dL  High             160-189 mg/dL  Very High        >189 mg/dL    Urinalysis, Microscopic Only - Urine, Clean Catch [[id number]]  (Abnormal) Collected: 05/30/23 1131    Specimen: Urine, Clean Catch Updated: 05/30/23 1202     RBC, UA 3-5 /HPF      WBC, UA None Seen /HPF      Bacteria, UA None Seen /HPF      Squamous Epithelial Cells, UA 0-2 /HPF      Hyaline Casts, UA 0-2 /LPF      Methodology Manual Light Microscopy    Urinalysis With Microscopic If Indicated (No Culture) - Urine, Clean Catch [[id number]]  (Abnormal) Collected: 05/30/23 1131    Specimen: Urine, Clean Catch Updated: 05/30/23 1142     Color, UA Yellow     Appearance, UA Clear      pH, UA 7.0     Specific Gravity, UA 1.020     Glucose, UA Negative     Ketones, UA Negative     Bilirubin, UA Negative     Blood, UA Small (1+)     Protein, UA Negative     Leuk Esterase, UA Negative     Nitrite, UA Negative     Urobilinogen, UA 0.2 E.U./dL    Single High Sensitivity Troponin T [[id number]]  (Normal) Collected: 05/30/23 1036    Specimen: Blood Updated: 05/30/23 1103     HS Troponin T 12 ng/L     Narrative:      High Sensitive Troponin T Reference Range:  <10.0 ng/L- Negative Female for AMI  <15.0 ng/L- Negative Male for AMI  >=10 - Abnormal Female indicating possible myocardial injury.  >=15 - Abnormal Male indicating possible myocardial injury.   Clinicians would have to utilize clinical acumen, EKG, Troponin, and serial changes to determine if it is an Acute Myocardial Infarction or myocardial injury due to an underlying chronic condition.         Comprehensive Metabolic Panel [[id number]]  (Abnormal) Collected: 05/30/23 1036    Specimen: Blood Updated: 05/30/23 1101     Glucose 148 mg/dL      BUN 11 mg/dL      Creatinine 1.14 mg/dL      Sodium 140 mmol/L      Potassium 4.0 mmol/L      Chloride 105 mmol/L      CO2 24.6 mmol/L      Calcium 9.0 mg/dL      Total Protein 7.1 g/dL      Albumin 4.2 g/dL      ALT (SGPT) 8 U/L      AST (SGOT) 13 U/L      Alkaline Phosphatase 80 U/L      Total Bilirubin 0.9 mg/dL      Globulin 2.9 gm/dL      A/G Ratio 1.4 g/dL      BUN/Creatinine Ratio 9.6     Anion Gap 10.4 mmol/L      eGFR 68.3 mL/min/1.73     Narrative:      GFR Normal >60  Chronic Kidney Disease <60  Kidney Failure <15    The GFR formula is only valid for adults with stable renal function between ages 18 and 70.    CBC & Differential [[id number]]  (Normal) Collected: 05/30/23 1036    Specimen: Blood Updated: 05/30/23 1046    Narrative:      The following orders were created for panel order CBC & Differential.  Procedure                               Abnormality         Status                      ---------                               -----------         ------                     CBC Auto Differential[[id number]]        Normal              Final result                 Please view results for these tests on the individual orders.    CBC Auto Differential [[id number]]  (Normal) Collected: 05/30/23 1036    Specimen: Blood Updated: 05/30/23 1046     WBC 6.74 10*3/mm3      RBC 5.51 10*6/mm3      Hemoglobin 16.4 g/dL      Hematocrit 50.2 %      MCV 91.1 fL      MCH 29.8 pg      MCHC 32.7 g/dL      RDW 12.5 %      RDW-SD 42.7 fl      MPV 10.1 fL      Platelets 291 10*3/mm3      Neutrophil % 49.8 %      Lymphocyte % 34.4 %      Monocyte % 9.6 %      Eosinophil % 4.6 %      Basophil % 1.3 %      Immature Grans % 0.3 %      Neutrophils, Absolute 3.35 10*3/mm3      Lymphocytes, Absolute 2.32 10*3/mm3      Monocytes, Absolute 0.65 10*3/mm3      Eosinophils, Absolute 0.31 10*3/mm3      Basophils, Absolute 0.09 10*3/mm3      Immature Grans, Absolute 0.02 10*3/mm3         Imaging Results (Most Recent)     Procedure Component Value Units Date/Time    US Venous Doppler Lower Extremity Bilateral (duplex) [[id number]] Collected: 05/31/23 1604     Updated: 05/31/23 1609    Narrative:      VENOUS DOPPLER ULTRASOUND, BILATERAL LOWER EXTREMITIES, COMPLETE,  05/31/2023     HISTORY:   72-year-old male hospital inpatient undergoing acute stroke assessment.  Patent foramen ovale on echo. Assess for lower extremity thromboembolic  source.     TECHNIQUE:   Venous Doppler ultrasound examination of both legs was performed using  grey-scale, spectral Doppler, and color flow Doppler ultrasound imaging.  Protocol includes popliteal and deep calf veins and the great saphenous  vein.     FINDINGS:   On the right, there is partially occlusive deep venous thrombus within  the common femoral vein and throughout the femoral vein in the thigh. No  DVT is seen in the popliteal vein or calf veins or within the deep  femoral vein. Great saphenous  vein is patent.     On the left, the examination is negative. There is no DVT from the groin  to the lower calf. Great saphenous vein is patent.       Impression:      1.  Nonocclusive right leg DVT from the common femoral vein at the groin  through the femoral vein in the distal thigh.  2.  No additional DVT in either leg. No SVT.     This report was finalized on 5/31/2023 4:07 PM by Dr. Cory Schaeffer MD.       MRI Angiogram Head Without Contrast [[id number]] Collected: 05/31/23 1527     Updated: 05/31/23 1543    Narrative:      MRI ANGIOGRAM HEAD WO CONTRAST-, MRI ANGIOGRAM NECK W WO CONTRAST-:  5/31/2023 8:40 AM     CLINICAL HISTORY: Small stroke seen by MRI, strokelike symptoms, prior  history of stroke, slurred speech and difficulty walking for 4 days.     TECHNIQUE: MR angiogram of both the head and the neck. MRA of the head  performed utilizing standard noncontrast time-of-flight techniques with  3-D reconstructions. Urinary of the neck performed both without and with  IV contrast utilizing time-of-flight techniques and postcontrast imaging  with 3-D reconstructions. Assessment for significant cervical ICA  stenosis measured as per NASCET criteria.     COMPARISON: MRI of the brain performed 05/30/2023     FINDINGS:      MRA HEAD:     No focal intracranial arterial vascular occlusion.     Short segment high-grade narrowing of the left M1 segment.     Otherwise, mild luminal irregularities of the intracranial vasculature  likely reflects atherosclerosis without additional high-grade narrowing  identified. No discrete saccular aneurysm is identified.     MRA NECK:     The right vertebral artery appears to have proximal V2 and V1 segments  being bifurcated. The left vertebral artery is widely patent. No  convincing high-grade stenosis involving the carotid vasculature on  either side.       Impression:         1.  No intracranial arterial vascular occlusion.  2.  Short segment high-grade narrowing of the left  M1 segment.  3.  Variant anatomy of the right vertebral artery. No flow-limiting  stenosis of the larger carotid or vertebral arterial vasculature of the  neck.     This report was finalized on 5/31/2023 3:41 PM by Bairon Norman MD.       MRI Angiogram Neck With & Without Contrast [[id number]] Collected: 05/31/23 1527     Updated: 05/31/23 1543    Narrative:      MRI ANGIOGRAM HEAD WO CONTRAST-, MRI ANGIOGRAM NECK W WO CONTRAST-:  5/31/2023 8:40 AM     CLINICAL HISTORY: Small stroke seen by MRI, strokelike symptoms, prior  history of stroke, slurred speech and difficulty walking for 4 days.     TECHNIQUE: MR angiogram of both the head and the neck. MRA of the head  performed utilizing standard noncontrast time-of-flight techniques with  3-D reconstructions. Urinary of the neck performed both without and with  IV contrast utilizing time-of-flight techniques and postcontrast imaging  with 3-D reconstructions. Assessment for significant cervical ICA  stenosis measured as per NASCET criteria.     COMPARISON: MRI of the brain performed 05/30/2023     FINDINGS:      MRA HEAD:     No focal intracranial arterial vascular occlusion.     Short segment high-grade narrowing of the left M1 segment.     Otherwise, mild luminal irregularities of the intracranial vasculature  likely reflects atherosclerosis without additional high-grade narrowing  identified. No discrete saccular aneurysm is identified.     MRA NECK:     The right vertebral artery appears to have proximal V2 and V1 segments  being bifurcated. The left vertebral artery is widely patent. No  convincing high-grade stenosis involving the carotid vasculature on  either side.       Impression:         1.  No intracranial arterial vascular occlusion.  2.  Short segment high-grade narrowing of the left M1 segment.  3.  Variant anatomy of the right vertebral artery. No flow-limiting  stenosis of the larger carotid or vertebral arterial vasculature of the  neck.     This report  was finalized on 5/31/2023 3:41 PM by Bairon Norman MD.       MRI Brain Without Contrast [[id number]] Collected: 05/30/23 1435     Updated: 05/30/23 1518    Narrative:      MRI BRAIN, 05/30/2023         HISTORY:  72-year-old male presenting to the ED with 2-3 day history of unsteady  gait, slurred speech. Past history of CVA.     TECHNIQUE:  Multisequence, multiplanar noncontrast MR imaging of the brain.     COMPARISON:  *  CT head, today.  *  MRI brain, 06/23/2021.     FINDINGS:  Single tiny focus of mildly restricted diffusion in the left  periventricular parietal centrum semiovale measuring about 6 mm, not  present on the prior exam. Likely focus of subacute ischemic insult.     No additional evidence of acute or subacute ischemia or other restricted  diffusion.     No evidence of acute intracranial hemorrhage.     Old left occipital lobe cortical and subcortical infarct in the PCA  territory, unchanged. Chronic multifocal lacunar infarcts in the basal  ganglia bilaterally. Severe diffuse chronic small vessel type white  matter changes, nonspecific but stable since the prior study.     Chronic generalized cerebral volume loss, greatest in the frontotemporal  regions, unchanged. Major cerebrovascular flow voids are preserved.       Impression:      1.  Tiny, likely subacute white matter infarct in the deep left parietal  white matter measuring about 6 mm, not hemorrhagic.  2.  No other evidence of acute or subacute ischemia or additional  restricted diffusion.  3.  Stable old left occipital infarct. Old bilateral basal ganglia  lacunar infarcts.  4.  Stable diffuse chronic changes as noted above.     This report was finalized on 5/30/2023 2:45 PM by Dr. Cory Schaeffer MD.       CT Head Without Contrast [[id number]] Collected: 05/30/23 1105     Updated: 05/30/23 1257    Narrative:      CT HEAD, NONCONTRAST, 05/30/2023     HISTORY:  72-year-old male in the ED with 2-3 day history of unsteady gait,  slurred speech.  Past history of CVA.     TECHNIQUE:    CT imaging of the head without IV contrast. Radiation dose reduction  techniques included automated exposure control or exposure modulation  based on body size. Radiation audit for CT and nuclear cardiology exams  in the last 12 months: 1.     COMPARISON:  *  CT head, 05/04/2021.     FINDINGS:    No acute intracranial abnormality is demonstrated.     Advanced generalized chronic cerebral volume loss, greatest over the  frontotemporal lobes where there are small chronic low-attenuation  subdural hygromas. Old midline left occipital infarct in PCA  distribution. Severe diffuse chronic small vessel type white matter  changes. Old lacunar infarcts in bilateral basal ganglia. These findings  are stable since the prior study.     No evidence of acute intracranial hemorrhage. No visible mass, mass  effect, convincing acute cerebral edema, enlarging extra-axial fluid  collection or progressive ventricular enlargement. Visualized upper  paranasal sinuses and mastoid air spaces are clear.       Impression:      1.  No acute intracranial abnormality. No evidence of acute intracranial  hemorrhage.  2.  Advanced atrophy, greater over the frontotemporal lobes where there  are small chronic low attenuation subdural hygromas that are stable.  3.  Old left occipital infarct. Old bilateral basal ganglia lacunar  infarcts. Extensive chronic white matter changes.  4.  No significant change since 05/04/2021.     This report was finalized on 5/30/2023 11:10 AM by Dr. Cory Schaeffer MD.             PROCEDURES      Condition on Discharge:  Stable, Improved.     Physical Exam at Discharge  Vital Signs  Temp:  [97.8 °F (36.6 °C)-98 °F (36.7 °C)] 97.8 °F (36.6 °C)  Heart Rate:  [50-65] 54  Resp:  [16-18] 16  BP: (156-177)/(80-83) 177/80    Physical Exam  Physical Exam:  General: Patient is awake and alert. Well developed and well nourished. No acute distress noted.   HENT: Head is atraumatic,  normocephalic. Hearing is grossly intact. Nose is without obvious congestion and appears patent. Neck is supple and trachea is midline.   Eyes: Vision is grossly intact. Pupils appear equal and round.   Cardiovascular: Heart has regular rate and rhythm with no murmurs, rubs or gallops noted.   Respiratory: Lungs are clear to ausculation without wheezes, rhonchi or rales.   Abdominal/GI: Soft, nontender, bowel sounds present. No rebound or guarding present.   Extremities: No peripheral edema noted.   Musculoskeletal: Spontaneous movement of bilateral upper and lower extremities against gravity noted. No signs of injury or deformity noted.   Skin: Warm and dry.   Psych: Mood and affect are appropriate. Cooperative with exam.   Neuro: No facial asymmetry noted. No focal deficits noted, hearing and vision are grossly intact.      Discharge Disposition  To home in stable condition with home health and walker supplied as a below    Visiting Nurse:    Yes    Home PT/OT:  Yes     Home Safety Evaluation:  No     DME  Walker, home health    Discharge Diet:      Dietary Orders (From admission, onward)     Start     Ordered    05/30/23 2352  Diet: Cardiac Diets, Diabetic Diets; Healthy Heart (2-3 Na+); Consistent Carbohydrate; Texture: Soft to Chew (NDD 3); Soft to Chew: Chopped Meat; Fluid Consistency: Thin (IDDSI 0)  Diet Effective Now        References:    Diet Order [facility]   Question Answer Comment   Diets: Cardiac Diets    Diets: Diabetic Diets    Cardiac Diet: Healthy Heart (2-3 Na+)    Diabetic Diet: Consistent Carbohydrate    Texture: Soft to Chew (NDD 3)    Soft to Chew: Chopped Meat    Fluid Consistency: Thin (IDDSI 0)        05/30/23 2352                Activity at Discharge:  As tolerated    Pre-discharge education  Stroke signs and symptoms       Follow-up Appointments  No future appointments.  Additional Instructions for the Follow-ups that You Need to Schedule     Discharge Follow-up with PCP   As directed        Currently Documented PCP:    Payton Salter APRN    PCP Phone Number:    [phone]     Follow Up Details: within 1 week               Test Results Pending at Discharge      Discharge over 30 min (if over 30 minutes give explanation as to why it took greater than 30 minutes)  Secondary to:   Coordination of care/follow up  Medication reconciliation  D/W patient      At [facility], we believe that sharing information builds trust and better relationships. You are receiving this note because you recently visited [facility]. It is possible you will see health information before a provider has talked with you about it. This kind of information can be easy to misunderstand. To help you fully understand what it means for your health, we urge you to discuss this note with your provider.    Sander Hauser DO  06/01/23  07:56 EDT

## 2023-06-01 NOTE — PLAN OF CARE
Problem: Adult Inpatient Plan of Care  Goal: Plan of Care Review  Outcome: Ongoing, Progressing     Problem: Adult Inpatient Plan of Care  Goal: Plan of Care Review  6/1/2023 0508 by Abigail Zurita RN  Outcome: Ongoing, Progressing  Flowsheets (Taken 6/1/2023 6819)  Progress: no change  Plan of Care Reviewed With: patient  Outcome Evaluation: Pt VSS, am labs resulted, Pt continues tele, SB, HR 45's-60's, continues room air overnight. Pt denies pain overnight, denies n/v/d, reports tolerating diet well. Pt continues up with standby assist, walker or cane. Pt reports desire to d/c home soon, reports plan to d/c with holter monitor zio patch. Pt resting at this time. Continues NIH, q shift, see flowsheets.

## 2023-06-01 NOTE — SIGNIFICANT NOTE
Discussed the case with Dr. Tom   Patient lower extremity revealed DVT   Will start apixaban 5 mg BID and aspirin 81  Holter monitor at discharge to evaluate for paroxysmal afib.   Neurology clinic follow up in one month.

## 2023-06-01 NOTE — DISCHARGE INSTR - APPOINTMENTS
Patient needs to call Payton Salter's office and make a hospital  follow up within 1-2 weeks of discharge. [phone]

## 2023-06-01 NOTE — PROGRESS NOTES
Adult Nutrition  Assessment/PES    Patient Name:  Cory Hardwick  YOB: 1950  MRN: [id number]  Admit Date:  5/30/2023    Assessment Date:  6/1/2023    Comments:  Edu on diet as below.   Not sure pt ready/able to make changes, will remain available.      Reason for Assessment     Row Name 06/01/23 1106          Reason for Assessment    Reason For Assessment follow-up protocol  edu     Diagnosis neurologic conditions                Nutrition/Diet History     Row Name 06/01/23 1106          Nutrition/Diet History    Typical Intake (Food/Fluid/EN/PN) Pt  getting zio patch with plan for d/c today. Son in room reports pt doesn't cook mostly eats frozen meals. Son reports trying to get pt to make healthier choices but not successful.     Meal/Snack Patterns frozen: egg, biscuit, sauasge, gravy in am, sandwich at lunch, frozen meal at dinner- drinks diet Mt Dew & Reg Dr Pepper                      Nutrition Prescription Ordered     Row Name 06/01/23 1107          Nutrition Prescription PO    Current PO Diet Soft Texture     Texture Chopped     Common Modifiers Cardiac;Consistent Carbohydrate                     Problem/Interventions:     Problem 2     Row Name 06/01/23 1107          Nutrition Diagnoses Problem 2    Problem 2 Knowledge Deficit     Etiology (related to) MNT for Treatment/Condition     Signs/Symptoms (evidenced by) Report/Observation                    Intervention Goal     Row Name 06/01/23 1107          Intervention Goal    General Provide information regarding MNT for treatment/condition                Nutrition Intervention     Row Name 06/01/23 1107          Nutrition Intervention    RD/Tech Action Follow Tx progress                  Education/Evaluation     Row Name 06/01/23 1108          Education    Education Provided education regarding;Advised regarding habits/behavior;Education topics  Edu on avoiding reg soda, choose diet/zero/SF. Edu on increasing veggies intake by using TAMIR canned  or frozen veggies in soup.me. Edu choosing healthier frozen meals ( lean cuisine, smartones, healthy choice. Supplement with salad/veggies.     Provided education regarding Diet rationale     Education Topics Cardiac diabetic;Na+  DM Plate Method & Low Na Nutrition Therapy provided w RD contact     Advised Regarding Habits/Behavior Meal planning;Seasoning food;Eating pattern;Food choices;Food prep        Monitor/Evaluation    Monitor Per protocol;I&O;PO intake;Pertinent labs;Weight;Symptoms     Education Follow-up Other (comment)  Son verbalize understanding, uncertain pt ready to make changes , barrier may be dentition/finances/cooking skills                 Electronically signed by:  Christelle Crooks RD  06/01/23 11:10 EDT

## 2023-06-01 NOTE — THERAPY DISCHARGE NOTE
Acute Care - Speech Language Pathology Discharge Summary   Angella Marino       Patient Name: Cory Hardwick  : 1950  MRN: [id number]    Today's Date: 2023                   Admit Date: 2023      SLP Recommendation and Plan    Visit Dx:    ICD-10-CM ICD-9-CM   1. Cerebrovascular accident (CVA), unspecified mechanism  I63.9 434.91   2. Dysarthria due to acute stroke  I63.9 434.91    R47.1 784.51   3. Cognitive communication deficit  R41.841 799.52                SLP GOALS     Row Name 23 1503 23 1530          Patient will demonstrate functional speech skills for return to discharge environment    Bent with minimal cues;with use of compensatory strategies  -AD with minimal cues;with use of compensatory strategies  -AD     Time frame 1 week  -AD 1 week  -AD     Barriers fatigue  -AD fatigue  -AD     Progress/Outcomes goal not met;discharged from facility;other (see comment)  -AD new goal  -AD     Comments discharged from facility prior to initiating therapy  -AD --        Patient will demonstrate functional cognitive-linguistic skills for return to discharge environment    Bent with minimal cues;with moderate cues;with use of compensatory strategies  -AD with minimal cues;with moderate cues;with use of compensatory strategies  -AD     Time frame 1 week  -AD 1 week  -AD     Barriers fatigue;baseline deficits  -AD fatigue;baseline deficits  -AD     Progress/Outcomes goal not met;discharged from facility;other (see comment)  -AD new goal  -AD     Comments discharged from facility prior to initiating therapy  -AD --        Articulation Goal 1 (SLP)    Improve Articulation Goal 1 (SLP) by over-articulating at phrase level;90%;with minimal cues (75-90%)  -AD by over-articulating at phrase level;90%;with minimal cues (75-90%)  -AD     Time Frame (Articulation Goal 1, SLP) short term goal (STG);1 week  -AD short term goal (STG);1 week  -AD     Progress/Outcomes (Articulation Goal 1,  SLP) goal not met;other (see comments)  -AD new goal  -AD     Comment (Articulation Goal 1, SLP) discharged from facility prior to initiating therapy  -AD --        Memory Skills Goal 1 (SLP)    Improve Memory Skills Through Goal 1 (SLP) listen to a paragraph and answer questions;recalling unrelated word lists with an imposed delay;80%;with minimal cues (75-90%)  -AD listen to a paragraph and answer questions;recalling unrelated word lists with an imposed delay;80%;with minimal cues (75-90%)  -AD     Time Frame (Memory Skills Goal 1, SLP) short term goal (STG);1 week  -AD short term goal (STG);1 week  -AD     Progress/Outcomes (Memory Skills Goal 1, SLP) goal not met;discharged from facility;other (see comments)  -AD new goal  -AD     Comment (Memory Skills Goal 1, SLP) discharged from facility prior to initiating therapy  -AD --           User Key  (r) = Recorded By, (t) = Taken By, (c) = Cosigned By    Initials Name Provider Type    AD Magdalena Vang, MS CCC-SLP Speech and Language Pathologist                  Therapy Charges for Today     Code Description Service Date Service Provider Modifiers Qty    [id number] HC ST EVAL SPEECH AND PROD W LANG  6 5/31/2023 Magdalena Vang MS CCC-SLP GN 1            SLP Discharge Summary  Anticipated Discharge Disposition (SLP): home with home health  Reason for Discharge: other (see comments) (Discharged prior to initiation of therapy)  Progress Toward Achieving Short/long Term Goals: goals not met within established timelines  Discharge Destination: home w/ home health      Magdalena Vang MS CCC-SLP  6/1/2023

## 2023-06-02 NOTE — CASE MANAGEMENT/SOCIAL WORK
Case Management Discharge Note      Final Note: dc home w [facility]         Selected Continued Care - Discharged on 6/1/2023 Admission date: 5/30/2023 - Discharge disposition: Home or Self Care    Destination    No services have been selected for the patient.              Durable Medical Equipment     Service Provider Selected Services Address Phone Fax Patient Preferred    [facility] [address] [place] [address] [phone] --          Dialysis/Infusion    No services have been selected for the patient.              Home Medical Care     Service Provider Selected Services Address Phone Fax Patient Preferred    [facility] [address] [address] [phone] --          Therapy    No services have been selected for the patient.              Community Resources    No services have been selected for the patient.              Community & DME    No services have been selected for the patient.                       Final Discharge Disposition Code: 06 - home with home health care

## 2023-06-02 NOTE — OUTREACH NOTE
Prep Survey    Flowsheet Row Responses   Spiritism facility patient discharged from? [place]   Is LACE score < 7 ? Yes   Eligibility Readm Mgmt   Discharge diagnosis Acute CVA   Does the patient have one of the following disease processes/diagnoses(primary or secondary)? Stroke   Does the patient have Home health ordered? Yes   What is the Home health agency?  [facility]    Is there a DME ordered? Yes   What DME was ordered? [facility]- [facility]   Prep survey completed? Yes          Shantal PINZON - Registered Nurse

## 2023-06-07 ENCOUNTER — READMISSION MANAGEMENT (OUTPATIENT)
Dept: CALL CENTER | Facility: HOSPITAL | Age: 73
End: 2023-06-07
Payer: MEDICARE

## 2023-06-07 NOTE — OUTREACH NOTE
Stroke Week 3 Survey      Flowsheet Row Responses   Confucianism facility patient discharged from? [place]   Does the patient have one of the following disease processes/diagnoses(primary or secondary)? Stroke   Week 3 attempt successful? No   Unsuccessful attempts Attempt 1            CHAPARRITA FAJARDO - Registered Nurse

## 2023-06-13 ENCOUNTER — READMISSION MANAGEMENT (OUTPATIENT)
Dept: CALL CENTER | Facility: HOSPITAL | Age: 73
End: 2023-06-13
Payer: MEDICARE

## 2023-06-13 NOTE — OUTREACH NOTE
Stroke Week 3 Survey      Flowsheet Row Responses   [facility] [place] patient discharged from? [place]   Does the patient have one of the following disease processes/diagnoses(primary or secondary)? Stroke   Week 3 attempt successful? Yes   Call start time 1400   Call end time 1404   Discharge diagnosis Acute CVA   Is patient permission given to speak with other caregiver? Yes   Person spoke with today (if not patient) and relationship Cory   Benson reviewed with patient/caregiver? Yes   Is the patient having any side effects they believe may be caused by any medication additions or changes? No   Does the patient have all medications ordered at discharge? Yes   Is the patient taking all medications as directed (includes completed medication regime)? Yes   Does the patient have a primary care provider?  Yes   Does the patient have an appointment with their PCP within 7 days of discharge? Yes   Has the patient kept scheduled appointments due by today? Yes   Week 3 call completed? Yes   Is the patient interested in additional calls from an ambulatory ?  NOTE:  applies to high risk patients requiring additional follow-up. No   Wrap up additional comments Son states his memory and ability to take medication is still off.            Linda LANGSTON - Registered Nurse

## 2023-06-21 LAB
MAXIMAL PREDICTED HEART RATE: 148 BPM
STRESS TARGET HR: 126 BPM

## 2025-07-08 ENCOUNTER — TRANSCRIBE ORDERS (OUTPATIENT)
Dept: ADMINISTRATIVE | Facility: HOSPITAL | Age: 75
End: 2025-07-08
Payer: MEDICARE

## 2025-07-08 ENCOUNTER — LAB (OUTPATIENT)
Dept: LAB | Facility: HOSPITAL | Age: 75
End: 2025-07-08
Payer: MEDICARE

## 2025-07-08 DIAGNOSIS — R97.20 ELEVATED PSA: Primary | ICD-10-CM

## 2025-07-08 DIAGNOSIS — R97.20 ELEVATED PSA: ICD-10-CM

## 2025-07-08 PROCEDURE — 84154 ASSAY OF PSA FREE: CPT

## 2025-07-08 PROCEDURE — 84153 ASSAY OF PSA TOTAL: CPT

## 2025-07-08 PROCEDURE — 36415 COLL VENOUS BLD VENIPUNCTURE: CPT

## 2025-07-09 LAB
PSA FREE MFR SERPL: 20.5 %
PSA FREE SERPL-MCNC: 1.17 NG/ML
PSA SERPL-MCNC: 5.7 NG/ML (ref 0–4)